# Patient Record
Sex: FEMALE | Race: OTHER | HISPANIC OR LATINO
[De-identification: names, ages, dates, MRNs, and addresses within clinical notes are randomized per-mention and may not be internally consistent; named-entity substitution may affect disease eponyms.]

---

## 2018-12-07 ENCOUNTER — APPOINTMENT (OUTPATIENT)
Dept: MRI IMAGING | Facility: CLINIC | Age: 68
End: 2018-12-07
Payer: MEDICARE

## 2018-12-07 ENCOUNTER — OUTPATIENT (OUTPATIENT)
Dept: OUTPATIENT SERVICES | Facility: HOSPITAL | Age: 68
LOS: 1 days | End: 2018-12-07
Payer: MEDICARE

## 2018-12-07 DIAGNOSIS — Z00.8 ENCOUNTER FOR OTHER GENERAL EXAMINATION: ICD-10-CM

## 2018-12-07 PROCEDURE — 73721 MRI JNT OF LWR EXTRE W/O DYE: CPT | Mod: 26,LT

## 2018-12-07 PROCEDURE — 72148 MRI LUMBAR SPINE W/O DYE: CPT

## 2018-12-07 PROCEDURE — 72148 MRI LUMBAR SPINE W/O DYE: CPT | Mod: 26

## 2018-12-07 PROCEDURE — 73721 MRI JNT OF LWR EXTRE W/O DYE: CPT

## 2020-01-12 ENCOUNTER — EMERGENCY (EMERGENCY)
Facility: HOSPITAL | Age: 70
LOS: 1 days | Discharge: HOME | End: 2020-01-12
Attending: EMERGENCY MEDICINE | Admitting: EMERGENCY MEDICINE
Payer: COMMERCIAL

## 2020-01-12 VITALS
HEART RATE: 67 BPM | DIASTOLIC BLOOD PRESSURE: 97 MMHG | SYSTOLIC BLOOD PRESSURE: 173 MMHG | OXYGEN SATURATION: 97 % | TEMPERATURE: 99 F | RESPIRATION RATE: 18 BRPM

## 2020-01-12 LAB
ALBUMIN SERPL ELPH-MCNC: 4.3 G/DL — SIGNIFICANT CHANGE UP (ref 3.3–5)
ALP SERPL-CCNC: 79 U/L — SIGNIFICANT CHANGE UP (ref 40–120)
ALT FLD-CCNC: 25 U/L — SIGNIFICANT CHANGE UP (ref 4–33)
ANION GAP SERPL CALC-SCNC: 14 MMO/L — SIGNIFICANT CHANGE UP (ref 7–14)
APTT BLD: 32.2 SEC — SIGNIFICANT CHANGE UP (ref 27.5–36.3)
AST SERPL-CCNC: 23 U/L — SIGNIFICANT CHANGE UP (ref 4–32)
BASOPHILS # BLD AUTO: 0.03 K/UL — SIGNIFICANT CHANGE UP (ref 0–0.2)
BASOPHILS NFR BLD AUTO: 0.7 % — SIGNIFICANT CHANGE UP (ref 0–2)
BILIRUB SERPL-MCNC: 0.2 MG/DL — SIGNIFICANT CHANGE UP (ref 0.2–1.2)
BLD GP AB SCN SERPL QL: NEGATIVE — SIGNIFICANT CHANGE UP
BUN SERPL-MCNC: 8 MG/DL — SIGNIFICANT CHANGE UP (ref 7–23)
CALCIUM SERPL-MCNC: 9.4 MG/DL — SIGNIFICANT CHANGE UP (ref 8.4–10.5)
CHLORIDE SERPL-SCNC: 98 MMOL/L — SIGNIFICANT CHANGE UP (ref 98–107)
CO2 SERPL-SCNC: 23 MMOL/L — SIGNIFICANT CHANGE UP (ref 22–31)
CREAT SERPL-MCNC: 0.46 MG/DL — LOW (ref 0.5–1.3)
EOSINOPHIL # BLD AUTO: 0.08 K/UL — SIGNIFICANT CHANGE UP (ref 0–0.5)
EOSINOPHIL NFR BLD AUTO: 1.8 % — SIGNIFICANT CHANGE UP (ref 0–6)
GLUCOSE SERPL-MCNC: 110 MG/DL — HIGH (ref 70–99)
HCT VFR BLD CALC: 40.9 % — SIGNIFICANT CHANGE UP (ref 34.5–45)
HGB BLD-MCNC: 13.4 G/DL — SIGNIFICANT CHANGE UP (ref 11.5–15.5)
IMM GRANULOCYTES NFR BLD AUTO: 0.2 % — SIGNIFICANT CHANGE UP (ref 0–1.5)
INR BLD: 1.02 — SIGNIFICANT CHANGE UP (ref 0.88–1.17)
LIDOCAIN IGE QN: 37 U/L — SIGNIFICANT CHANGE UP (ref 7–60)
LYMPHOCYTES # BLD AUTO: 2.15 K/UL — SIGNIFICANT CHANGE UP (ref 1–3.3)
LYMPHOCYTES # BLD AUTO: 48.3 % — HIGH (ref 13–44)
MAGNESIUM SERPL-MCNC: 2 MG/DL — SIGNIFICANT CHANGE UP (ref 1.6–2.6)
MCHC RBC-ENTMCNC: 29.5 PG — SIGNIFICANT CHANGE UP (ref 27–34)
MCHC RBC-ENTMCNC: 32.8 % — SIGNIFICANT CHANGE UP (ref 32–36)
MCV RBC AUTO: 89.9 FL — SIGNIFICANT CHANGE UP (ref 80–100)
MONOCYTES # BLD AUTO: 0.35 K/UL — SIGNIFICANT CHANGE UP (ref 0–0.9)
MONOCYTES NFR BLD AUTO: 7.9 % — SIGNIFICANT CHANGE UP (ref 2–14)
NEUTROPHILS # BLD AUTO: 1.83 K/UL — SIGNIFICANT CHANGE UP (ref 1.8–7.4)
NEUTROPHILS NFR BLD AUTO: 41.1 % — LOW (ref 43–77)
NRBC # FLD: 0 K/UL — SIGNIFICANT CHANGE UP (ref 0–0)
PHOSPHATE SERPL-MCNC: 3 MG/DL — SIGNIFICANT CHANGE UP (ref 2.5–4.5)
PLATELET # BLD AUTO: 271 K/UL — SIGNIFICANT CHANGE UP (ref 150–400)
PMV BLD: 8.5 FL — SIGNIFICANT CHANGE UP (ref 7–13)
POTASSIUM SERPL-MCNC: 4.3 MMOL/L — SIGNIFICANT CHANGE UP (ref 3.5–5.3)
POTASSIUM SERPL-SCNC: 4.3 MMOL/L — SIGNIFICANT CHANGE UP (ref 3.5–5.3)
PROT SERPL-MCNC: 8 G/DL — SIGNIFICANT CHANGE UP (ref 6–8.3)
PROTHROM AB SERPL-ACNC: 11.6 SEC — SIGNIFICANT CHANGE UP (ref 9.8–13.1)
RBC # BLD: 4.55 M/UL — SIGNIFICANT CHANGE UP (ref 3.8–5.2)
RBC # FLD: 13.1 % — SIGNIFICANT CHANGE UP (ref 10.3–14.5)
RH IG SCN BLD-IMP: POSITIVE — SIGNIFICANT CHANGE UP
SODIUM SERPL-SCNC: 135 MMOL/L — SIGNIFICANT CHANGE UP (ref 135–145)
WBC # BLD: 4.45 K/UL — SIGNIFICANT CHANGE UP (ref 3.8–10.5)
WBC # FLD AUTO: 4.45 K/UL — SIGNIFICANT CHANGE UP (ref 3.8–10.5)

## 2020-01-12 PROCEDURE — 99284 EMERGENCY DEPT VISIT MOD MDM: CPT

## 2020-01-12 RX ORDER — SODIUM CHLORIDE 9 MG/ML
1000 INJECTION, SOLUTION INTRAVENOUS ONCE
Refills: 0 | Status: COMPLETED | OUTPATIENT
Start: 2020-01-12 | End: 2020-01-12

## 2020-01-12 RX ORDER — ACETAMINOPHEN 500 MG
650 TABLET ORAL ONCE
Refills: 0 | Status: COMPLETED | OUTPATIENT
Start: 2020-01-12 | End: 2020-01-12

## 2020-01-12 RX ADMIN — SODIUM CHLORIDE 2000 MILLILITER(S): 9 INJECTION, SOLUTION INTRAVENOUS at 21:54

## 2020-01-12 RX ADMIN — SODIUM CHLORIDE 1000 MILLILITER(S): 9 INJECTION, SOLUTION INTRAVENOUS at 23:50

## 2020-01-12 RX ADMIN — Medication 650 MILLIGRAM(S): at 21:54

## 2020-01-12 RX ADMIN — Medication 650 MILLIGRAM(S): at 23:00

## 2020-01-12 NOTE — ED PROVIDER NOTE - OBJECTIVE STATEMENT
69 Y F with hx of IPF not on any meds as well as HLD here with RUQ pain that started 2 days ago. Pt notes that she was just getting over flu like ill ness that started last week and developed RUQ pain radiating to her back. Pain is worse with laying down especially at night. She notes that she has associated nausea. She denies fevers, vomiting. She states that food doesn't make the pain worse. Denies dysuria, fever, hematuria.

## 2020-01-12 NOTE — ED ADULT NURSE NOTE - OBJECTIVE STATEMENT
Patient received to room 2, A&Ox4, ambulatory, c/o R sided flank pain for three days. Pt. reports she was recently treated for the flu and her symptoms started after. Abdomen soft, nondistended, mildly tender on palpation to R side. Pt. states pain radiates to R side of back. Denies hematuria/dysuria. Denies CP/SOB/HA, no nausea/vomiting/diarrhea. 18 G IV placed to R AC, labs drawn and sent. MD at bedside. VS as noted. Awaiting further orders, will continue to monitor.

## 2020-01-12 NOTE — ED PROVIDER NOTE - PATIENT PORTAL LINK FT
You can access the FollowMyHealth Patient Portal offered by [facility] by registering at the following website: [email or web address]. By joining [employer]’s FollowMyHealth portal, you will also be able to view your health information using other applications (apps) compatible with our system.

## 2020-01-12 NOTE — ED PROVIDER NOTE - ATTENDING CONTRIBUTION TO CARE
69F p/w  lower abd pain rad to back.  Pt had a flu like illness x 1 week, pt thinks she's getting better.   pt had cough and fever.  Also had vomiting 1 week ago.  The pain started 2 days ago, sharp pain.  Had the pain a long time ago, did not seek medical care.  Pain is worse with laying down, no change with eating, worse at night, difficulty sleeping; pt also with restless leg syndrome.   Also c/o HA.  PMXH - pulm fibrosis/emphysema, HLD, HTN (not on meds).  meds- zocor  No T.  PSHX - ov cyst removal.  All - NKA.  No dysuria or hematuria.  did not take medicine today.  Concern for diverticulitis, colitis, appendicitis, pyelonephritis, cholecystitis, check labs, CT, urine, bedside US, reass.  Rx tylenol and fluids for now. 69F p/w  lower abd pain rad to back.  Pt had a flu like illness x 1 week, pt thinks she's getting better.   pt had cough and fever.  Also had vomiting 1 week ago.  The pain started 2 days ago, sharp pain.  Had the pain a long time ago, did not seek medical care.  Pain is worse with laying down, no change with eating, worse at night, difficulty sleeping; pt also with restless leg syndrome.   Also c/o HA.  PMXH - pulm fibrosis/emphysema, HLD, HTN (not on meds).  meds- zocor  No T.  PSHX - ov cyst removal.  All - NKA.  No dysuria or hematuria.  did not take medicine today.  Concern for diverticulitis, colitis, appendicitis, pyelonephritis, cholecystitis, check labs, CT, urine, bedside US, reass.  Rx tylenol and fluids for now.  Check labs for anemia, electrolyte disturbance, acute kidney injury, liver dysfucntion, reassess.  VS:  unremarkable except htn    GEN - mild distress abd pain, malaise A+O x3   HEAD - NC/AT     ENT - PEERL, EOMI, mucous membranes dry , no discharge      NECK: Neck supple, non-tender without lymphadenopathy, no masses, no JVD  PULM - CTA b/l,  symmetric breath sounds  COR -  normal heart sounds    ABD - , ND, RUQ, RLQ ttp, soft,  BACK - no CVA tenderness, nontender spine     EXTREMS - no edema, no deformity, warm and well perfused    SKIN - no rash or bruising      NEUROLOGIC - alert, CN 2-12 intact, sensation nl, motor no focal deficit.

## 2020-01-12 NOTE — ED PROVIDER NOTE - NSFOLLOWUPINSTRUCTIONS_ED_ALL_ED_FT
You have signs of gallstones without any signs of gallbladder infection. You need to follow up with general surgery if you continue to have the pain you will likely need to get your gallbladder removed. We gave you the general surgery clinic number so you can have this done electively if you wish. If you have any severe increase in pain, fever, uncontrollable nausea vomiting, or inability to tolerate eating and drinking you need to come right back to the emergency room.

## 2020-01-12 NOTE — ED PROVIDER NOTE - CLINICAL SUMMARY MEDICAL DECISION MAKING FREE TEXT BOX
69 Y F with hx of HLD here with RUQ pain x 2 days with associated nausea, on exam TTP RUQ and RLQ. Will get labs including pre-ops, will get lipase. Will order CT AP with contrast possible kidney stone vs appy, will do bedside US if negative then will proceed with CT, pain control.

## 2020-01-12 NOTE — ED PROVIDER NOTE - PROGRESS NOTE DETAILS
Resident: Rigo Stephenson – Pt was re-evaluated at bedside, VSS, feeling better overall. Results were discussed with patient as well as return precautions and follow up plan with PCP and/or specialist. Time was taken to answer any questions that the patient had before providing them with discharge paperwork.

## 2020-01-13 VITALS
OXYGEN SATURATION: 99 % | TEMPERATURE: 98 F | SYSTOLIC BLOOD PRESSURE: 151 MMHG | HEART RATE: 58 BPM | DIASTOLIC BLOOD PRESSURE: 85 MMHG | RESPIRATION RATE: 16 BRPM

## 2020-01-13 DIAGNOSIS — Z98.890 OTHER SPECIFIED POSTPROCEDURAL STATES: Chronic | ICD-10-CM

## 2020-01-13 PROCEDURE — 74177 CT ABD & PELVIS W/CONTRAST: CPT | Mod: 26

## 2020-01-13 PROCEDURE — 76705 ECHO EXAM OF ABDOMEN: CPT | Mod: 26

## 2020-01-15 RX ORDER — METRONIDAZOLE 500 MG
1 TABLET ORAL
Qty: 21 | Refills: 0
Start: 2020-01-15 | End: 2020-01-21

## 2020-01-15 RX ORDER — CIPROFLOXACIN LACTATE 400MG/40ML
1 VIAL (ML) INTRAVENOUS
Qty: 14 | Refills: 0
Start: 2020-01-15 | End: 2020-01-21

## 2020-01-25 ENCOUNTER — EMERGENCY (EMERGENCY)
Facility: HOSPITAL | Age: 70
LOS: 1 days | Discharge: HOME | End: 2020-01-25
Attending: EMERGENCY MEDICINE | Admitting: EMERGENCY MEDICINE
Payer: COMMERCIAL

## 2020-01-25 VITALS
HEART RATE: 61 BPM | RESPIRATION RATE: 17 BRPM | DIASTOLIC BLOOD PRESSURE: 73 MMHG | OXYGEN SATURATION: 100 % | SYSTOLIC BLOOD PRESSURE: 138 MMHG | TEMPERATURE: 97 F

## 2020-01-25 VITALS
TEMPERATURE: 99 F | SYSTOLIC BLOOD PRESSURE: 137 MMHG | HEART RATE: 72 BPM | DIASTOLIC BLOOD PRESSURE: 76 MMHG | OXYGEN SATURATION: 98 % | RESPIRATION RATE: 16 BRPM

## 2020-01-25 DIAGNOSIS — Z98.890 OTHER SPECIFIED POSTPROCEDURAL STATES: Chronic | ICD-10-CM

## 2020-01-25 PROBLEM — E78.5 HYPERLIPIDEMIA, UNSPECIFIED: Chronic | Status: ACTIVE | Noted: 2020-01-13

## 2020-01-25 PROBLEM — J84.10 PULMONARY FIBROSIS, UNSPECIFIED: Chronic | Status: ACTIVE | Noted: 2020-01-13

## 2020-01-25 PROBLEM — I10 ESSENTIAL (PRIMARY) HYPERTENSION: Chronic | Status: ACTIVE | Noted: 2020-01-13

## 2020-01-25 LAB
ALBUMIN SERPL ELPH-MCNC: 4 G/DL — SIGNIFICANT CHANGE UP (ref 3.3–5)
ALP SERPL-CCNC: 76 U/L — SIGNIFICANT CHANGE UP (ref 40–120)
ALT FLD-CCNC: 28 U/L — SIGNIFICANT CHANGE UP (ref 4–33)
ANION GAP SERPL CALC-SCNC: 11 MMO/L — SIGNIFICANT CHANGE UP (ref 7–14)
APPEARANCE UR: CLEAR — SIGNIFICANT CHANGE UP
AST SERPL-CCNC: 35 U/L — HIGH (ref 4–32)
BASOPHILS # BLD AUTO: 0.04 K/UL — SIGNIFICANT CHANGE UP (ref 0–0.2)
BASOPHILS NFR BLD AUTO: 0.6 % — SIGNIFICANT CHANGE UP (ref 0–2)
BILIRUB SERPL-MCNC: 0.3 MG/DL — SIGNIFICANT CHANGE UP (ref 0.2–1.2)
BILIRUB UR-MCNC: NEGATIVE — SIGNIFICANT CHANGE UP
BLOOD UR QL VISUAL: NEGATIVE — SIGNIFICANT CHANGE UP
BUN SERPL-MCNC: 14 MG/DL — SIGNIFICANT CHANGE UP (ref 7–23)
CALCIUM SERPL-MCNC: 8.8 MG/DL — SIGNIFICANT CHANGE UP (ref 8.4–10.5)
CHLORIDE SERPL-SCNC: 104 MMOL/L — SIGNIFICANT CHANGE UP (ref 98–107)
CO2 SERPL-SCNC: 22 MMOL/L — SIGNIFICANT CHANGE UP (ref 22–31)
COLOR SPEC: SIGNIFICANT CHANGE UP
CREAT SERPL-MCNC: 0.48 MG/DL — LOW (ref 0.5–1.3)
EOSINOPHIL # BLD AUTO: 0.14 K/UL — SIGNIFICANT CHANGE UP (ref 0–0.5)
EOSINOPHIL NFR BLD AUTO: 2 % — SIGNIFICANT CHANGE UP (ref 0–6)
GLUCOSE SERPL-MCNC: 91 MG/DL — SIGNIFICANT CHANGE UP (ref 70–99)
GLUCOSE UR-MCNC: NEGATIVE — SIGNIFICANT CHANGE UP
HCT VFR BLD CALC: 38.7 % — SIGNIFICANT CHANGE UP (ref 34.5–45)
HGB BLD-MCNC: 12.7 G/DL — SIGNIFICANT CHANGE UP (ref 11.5–15.5)
IMM GRANULOCYTES NFR BLD AUTO: 0.3 % — SIGNIFICANT CHANGE UP (ref 0–1.5)
KETONES UR-MCNC: NEGATIVE — SIGNIFICANT CHANGE UP
LEUKOCYTE ESTERASE UR-ACNC: NEGATIVE — SIGNIFICANT CHANGE UP
LYMPHOCYTES # BLD AUTO: 2.53 K/UL — SIGNIFICANT CHANGE UP (ref 1–3.3)
LYMPHOCYTES # BLD AUTO: 35.7 % — SIGNIFICANT CHANGE UP (ref 13–44)
MAGNESIUM SERPL-MCNC: 2.1 MG/DL — SIGNIFICANT CHANGE UP (ref 1.6–2.6)
MCHC RBC-ENTMCNC: 30.2 PG — SIGNIFICANT CHANGE UP (ref 27–34)
MCHC RBC-ENTMCNC: 32.8 % — SIGNIFICANT CHANGE UP (ref 32–36)
MCV RBC AUTO: 91.9 FL — SIGNIFICANT CHANGE UP (ref 80–100)
MONOCYTES # BLD AUTO: 0.58 K/UL — SIGNIFICANT CHANGE UP (ref 0–0.9)
MONOCYTES NFR BLD AUTO: 8.2 % — SIGNIFICANT CHANGE UP (ref 2–14)
NEUTROPHILS # BLD AUTO: 3.77 K/UL — SIGNIFICANT CHANGE UP (ref 1.8–7.4)
NEUTROPHILS NFR BLD AUTO: 53.2 % — SIGNIFICANT CHANGE UP (ref 43–77)
NITRITE UR-MCNC: NEGATIVE — SIGNIFICANT CHANGE UP
NRBC # FLD: 0 K/UL — SIGNIFICANT CHANGE UP (ref 0–0)
PH UR: 5.5 — SIGNIFICANT CHANGE UP (ref 5–8)
PHOSPHATE SERPL-MCNC: 3.4 MG/DL — SIGNIFICANT CHANGE UP (ref 2.5–4.5)
PLATELET # BLD AUTO: 305 K/UL — SIGNIFICANT CHANGE UP (ref 150–400)
PMV BLD: 8.7 FL — SIGNIFICANT CHANGE UP (ref 7–13)
POTASSIUM SERPL-MCNC: 5.3 MMOL/L — SIGNIFICANT CHANGE UP (ref 3.5–5.3)
POTASSIUM SERPL-SCNC: 5.3 MMOL/L — SIGNIFICANT CHANGE UP (ref 3.5–5.3)
PROT SERPL-MCNC: 7.7 G/DL — SIGNIFICANT CHANGE UP (ref 6–8.3)
PROT UR-MCNC: NEGATIVE — SIGNIFICANT CHANGE UP
RBC # BLD: 4.21 M/UL — SIGNIFICANT CHANGE UP (ref 3.8–5.2)
RBC # FLD: 14.3 % — SIGNIFICANT CHANGE UP (ref 10.3–14.5)
SODIUM SERPL-SCNC: 137 MMOL/L — SIGNIFICANT CHANGE UP (ref 135–145)
SP GR SPEC: 1.01 — SIGNIFICANT CHANGE UP (ref 1–1.04)
UROBILINOGEN FLD QL: NORMAL — SIGNIFICANT CHANGE UP
WBC # BLD: 7.08 K/UL — SIGNIFICANT CHANGE UP (ref 3.8–10.5)
WBC # FLD AUTO: 7.08 K/UL — SIGNIFICANT CHANGE UP (ref 3.8–10.5)

## 2020-01-25 PROCEDURE — 99284 EMERGENCY DEPT VISIT MOD MDM: CPT

## 2020-01-25 RX ORDER — METOCLOPRAMIDE HCL 10 MG
10 TABLET ORAL ONCE
Refills: 0 | Status: COMPLETED | OUTPATIENT
Start: 2020-01-25 | End: 2020-01-25

## 2020-01-25 RX ORDER — ACETAMINOPHEN 500 MG
975 TABLET ORAL ONCE
Refills: 0 | Status: COMPLETED | OUTPATIENT
Start: 2020-01-25 | End: 2020-01-25

## 2020-01-25 RX ADMIN — Medication 975 MILLIGRAM(S): at 17:34

## 2020-01-25 RX ADMIN — Medication 975 MILLIGRAM(S): at 17:21

## 2020-01-25 RX ADMIN — Medication 10 MILLIGRAM(S): at 17:21

## 2020-01-25 NOTE — ED PROVIDER NOTE - NSFOLLOWUPINSTRUCTIONS_ED_ALL_ED_FT
Please follow up with your primary doctor in 1-3 days to ensure resolution of symptoms.    Please contact the GI physician, listed below, to schedule an appointment to discuss your GERD. You can use 500-1000mg Tylenol every 6 hours for pain - as needed; maximal daily dose 3000mg.  This is an over-the-counter medications - please respect the warnings on the label. This medication come with certain risks and side effects that you need to discuss with your doctor, especially if you are taking it for a prolonged period.     Please follow up with your primary doctor in 1-3 days to ensure resolution of symptoms.    Please contact the GI physician, listed below, to schedule an appointment to discuss your GERD.

## 2020-01-25 NOTE — ED ADULT TRIAGE NOTE - CHIEF COMPLAINT QUOTE
states" I am having ahead ache for 3 days and I have black/brown stools and I feel weak. was admitted and d/c from here bon 17th and took antibiotics. unable to give me further details. h/o Pulmonary fibrosis

## 2020-01-25 NOTE — ED PROVIDER NOTE - PHYSICAL EXAMINATION
Vital signs reviewed.  CONSTITUTIONAL: NAD, awake  HEAD: Normocephalic; atraumatic  EYES: EOMI, no nystagmus, no conjunctival injection, no scleral icterus  MOUTH/THROAT:  MMM  NECK: Trachea midline, no JVD  CV: Normal S1, S2; no audible murmurs; extremities WWP  RESP: normal work of breathing; CTAB, no stridor  ABD: soft, non-distended; non-tender  : Deferred  MSK/EXT: no edema, normal ROM in all four extremities, no deformities  SKIN: No gross rashes or lesions on exposed skin, no significant trophic changes  NEURO: aaox3, moving all extremities purposefully and spontaneously

## 2020-01-25 NOTE — ED ADULT NURSE NOTE - NSIMPLEMENTINTERV_GEN_ALL_ED
Implemented All Fall with Harm Risk Interventions:  [place] to call system. Call bell, personal items and telephone within reach. Instruct patient to call for assistance. Room bathroom lighting operational. Non-slip footwear when patient is off stretcher. Physically safe environment: no spills, clutter or unnecessary equipment. Stretcher in lowest position, wheels locked, appropriate side rails in place. Provide visual cue, wrist band, yellow gown, etc. Monitor gait and stability. Monitor for mental status changes and reorient to person, place, and time. Review medications for side effects contributing to fall risk. Reinforce activity limits and safety measures with patient and family. Provide visual clues: red socks.

## 2020-01-25 NOTE — ED ADULT NURSE NOTE - OBJECTIVE STATEMENT
Pt presents to the ED AxOx4, ambulatory, c/o headache x3 days, abdominal pain and weakness. Pt states that her stool has been brown and black. Pt states that she was admitted here on the 17th and was dc with antibiotics due to colitis. Denies chest pain, shortness of breath, difficulty breathing. Respirations even and unlabored. Abdomen distended and tender to touch. Skin warm, dry and intact. No swelling noted in extremities. 20 gauge IV placed in R hand. Comfort measures provided. Awaiting further orders. Will continue to monitor.

## 2020-01-25 NOTE — ED PROVIDER NOTE - CLINICAL SUMMARY MEDICAL DECISION MAKING FREE TEXT BOX
70F PMH pulmonary fibrosis, diverticulitis presents with headache and fatigue x2 days.  No dark stools no BRBPR.  Will get labs to ensure no change in hgb from recent ED presentation, give reglan for headache, give GI outpatient contact info.

## 2020-01-25 NOTE — ED PROVIDER NOTE - NSFOLLOWUPCLINICS_GEN_ALL_ED_FT
[facility]  Gastroenterology  [address] [place] [id number]  Phone: [phone]  Fax:   Follow Up Time: Routine

## 2020-01-25 NOTE — ED PROVIDER NOTE - PROGRESS NOTE DETAILS
Patient reassessed, feeling improved.  INformed of benign labs, will f/u with PCP and GI.  Patient agreeable.

## 2020-01-25 NOTE — ED PROVIDER NOTE - ATTENDING CONTRIBUTION TO CARE
Attending Statement: I have personally seen and examined this patient. I have fully participated in the care of this patient. I have reviewed all pertinent clinical information, including history physical exam, plan and the Resident's note and agree except as noted  69yo F hx of pulmonary fibrosis, diverticulosis recent dc w 10 course of cipro/flagly, pw feeling lightheaded, dizzy and having a headache x 2 days. States stool is now brown, no hx of BRBPR, no abdominal pain. no vomit; no diarrhea. no cp or sob. Felt spinning sensation yesterday none today. +burning, sour taste in the tongue  and a dull headache. no focal deficits. no numbness/weakness. no change in vision. no slurred speech. not on AC.    Vital signs noted. anxious female. nontoxic appearing. mmm. not pale. normal S1-S2 No resp distress. able to speak in full and clear sentences. no wheeze, rales or stridor. soft nontender abdomen. no  rebound. no guarding. no sign of trauma. no CVAT AOx3, no focal deficits. CN 2-12 grossly intact. nl gait. no meningeal signs. no nystagmus. no pedal edema. no calf tenderness. normal pulses bilateral feet.   plan labs, med and re assess Attending Statement: I have personally seen and examined this patient. I have fully participated in the care of this patient. I have reviewed all pertinent clinical information, including history physical exam, plan and the Resident's note and agree except as noted  69yo F hx of pulmonary fibrosis, diverticulosis recent dc w 10 course of cipro/flagly, pw feeling lightheaded, dizzy and having a headache x 2 days. States stool is now brown, no hx of BRBPR, no abdominal pain. no vomit; no diarrhea. no cp or sob. Felt spinning sensation yesterday none today. +burning, sour taste in the tongue  and a dull headache. no focal deficits. no numbness/weakness. no change in vision. no slurred speech. not on AC.     Vital signs noted. anxious female. nontoxic appearing. mmm. not pale. normal S1-S2 No resp distress. able to speak in full and clear sentences. no wheeze, rales or stridor. soft nontender abdomen. no  rebound. no guarding. no sign of trauma. no CVAT AOx3, no focal deficits. CN 2-12 grossly intact. nl gait. no meningeal signs. no nystagmus. no pedal edema. no calf tenderness. normal pulses bilateral feet.   plan labs, med and re assess

## 2020-01-25 NOTE — ED PROVIDER NOTE - NS ED ROS FT
In additional the that documented in the HPI, the additional ROS was obtained:    CONSTITUTIONAL: No fever, no chills  EYES: no change in vision, no blurred vision  HEENT: no trouble swallowing, no trouble speaking, no sore throat  NECK: no pain, no stiffness  CV: no chest pain, no palpitations  RESP: no cough, no shortness of breath  GI: no abdominal pain, no nausea, no vomiting, no diarrhea, no constipation, no BRBPR or melena  : No dysuria, no frequency  NEURO: +headache, no new numbness, no weakness, no dizziness  SKIN: no new rashes  HEME: No easy bruising or bleeding  MSK: No joint pain, no recent trauma  Juan Miguel Osorio M.D. -Resident

## 2020-01-25 NOTE — ED PROVIDER NOTE - OBJECTIVE STATEMENT
70F pmh pulmonary fibrosis, diverticulosis with recent diverticulitis s/p cipro/flagyl 10 day course presents with headache and fatigue, dizziness past 2 days.  She was seen in our ED, found to have gallstones, uncomplicated diverticulitis given 10 days cipro/flagyl which she finished.  No more black stools, abd pain improved.    She now has mild headache and is concerned for anemia.  No fevers, URI sx, cough, SOB, CP, nausea, vomiting, diarrhea or constipation.  She does endorse loose stools.  She also has GERD symptoms and would like to see a gastroenterologist.

## 2020-01-25 NOTE — ED PROVIDER NOTE - PATIENT PORTAL LINK FT
You can access the FollowMyHealth Patient Portal offered by [facility] by registering at the following website: [email or web address]. By joining [employer]’s FollowMyHealth portal, you will also be able to view your health information using other applications (apps) compatible with our system.

## 2020-01-28 ENCOUNTER — APPOINTMENT (OUTPATIENT)
Dept: GASTROENTEROLOGY | Facility: CLINIC | Age: 70
End: 2020-01-28
Payer: MEDICARE

## 2020-01-28 VITALS — OXYGEN SATURATION: 95 % | BODY MASS INDEX: 31.36 KG/M2 | HEIGHT: 63 IN | HEART RATE: 78 BPM | WEIGHT: 177 LBS

## 2020-01-28 DIAGNOSIS — Z86.69 PERSONAL HISTORY OF OTHER DISEASES OF THE NERVOUS SYSTEM AND SENSE ORGANS: ICD-10-CM

## 2020-01-28 DIAGNOSIS — K57.10 DIVERTICULOSIS OF SMALL INTESTINE W/OUT PERFORATION OR ABSCESS W/OUT BLEEDING: ICD-10-CM

## 2020-01-28 DIAGNOSIS — Z87.42 PERSONAL HISTORY OF OTHER DISEASES OF THE FEMALE GENITAL TRACT: ICD-10-CM

## 2020-01-28 DIAGNOSIS — K57.30 DIVERTICULOSIS OF LARGE INTESTINE W/OUT PERFORATION OR ABSCESS W/OUT BLEEDING: ICD-10-CM

## 2020-01-28 DIAGNOSIS — J43.9 EMPHYSEMA, UNSPECIFIED: ICD-10-CM

## 2020-01-28 DIAGNOSIS — R14.0 ABDOMINAL DISTENSION (GASEOUS): ICD-10-CM

## 2020-01-28 PROCEDURE — 99205 OFFICE O/P NEW HI 60 MIN: CPT

## 2020-01-28 NOTE — ASSESSMENT
[FreeTextEntry1] : Impression:\par \par #1 abdominal pain-episode of abdominal pain starting 1/10/2020 prompting ER evaluation. Some disparity in description of symptoms- reports patient indicated pain was across the lower abdomen and right side of the abdomen. Patient indicates pain was right upper quadrant. Gallstones noted and question exists as to whether episode of pain was biliary colic. However, difficult to say as symptoms appeared in context of resolving somewhat severe viral flulike illness with other somatic and constitutional symptoms. In addition, equivocal bowel wall thickening of ascending and transverse colon noted although might have been under distention. Clear inflammatory process at that site not reported. Diverticulitis not reported. Episode of abdominal pain currently resolved and essentially symptomatic from GI standpoint at this time except for chronic gaseous symptoms.\par \par #2 cholelithiasis-detected on ultrasound. As noted, not clear if abdominal pain prompting recent ER visit was biliary colic versus an incidental finding.\par \par #3 diverticulosis-marked diverticulosis noted mostly in the sigmoid and descending colon on CT. No indication or report of acute diverticulitis.\par \par #4 ascending and transverse colon wall thickening-equivocal finding possibly under distention. Inflammatory changes at that site not reported. Correlate with recent colonoscopy of 12/2019.\par \par #5 duodenal diverticulum.\par \par #6 colon cancer screening-reports normal colonoscopy in 12/2019. Patient average risk.\par \par #7 abdominal gaseous discomfort-chronic symptoms of gas and bloating.\par \par #8 pulmonary fibrosis.\par \par #9 history of emphysema-history of mild obstructive sleep apnea.\par \par #10 obesity.\par \par #11 viral syndrome-flulike viral symptoms 1/2020 consistent with a viral illness. Resolved at the current time.\par \par #12 history of cervical dysplasia.\par \par #13 dysgeusia-describes acid-like feeling amount. Only since antibiotics which is likely etiology from metronidazole.

## 2020-01-28 NOTE — HISTORY OF PRESENT ILLNESS
[FreeTextEntry1] : Office consultation on 1/28/2020.\par \par The patient is a 70-year-old woman evaluated for consultation regarding recent episode of abdominal pain and abnormal abdominal imaging following an emergency room evaluation.\par \par Patient was in usual state of health until approximately 1/5/2028 when she experienced "cold and flulike like" symptoms with complaints of cough, headache, diarrhea, nausea, vomiting weakness, joint pain and fever. Initially was constipated for 2 days and then had several days of nonbloody diarrhea. Experienced nausea with few episodes of nonbloody nonbilious emesis.  observed patient to be mildly confused. No abdominal pain at that time.\par \par These viral and flulike symptoms gradually improved over the course of a week. Then on 1/10/2020 patient experienced onset of abdominal pain. Of note,  indicates that the pain was across the lower abdomen and right side of the abdomen. Patient says the pain was right upper quadrant. This pain was described as a constant and sharp pain with some radiation to back. By that time fever had resolved and there was no further nausea and vomiting.\par \par Symptoms prompted ER evaluation on 1/12/2020. Evaluation at that time:\par -CMP: Normal except for glucose 110. Creatinine 0.46. Normal liver enzymes including ALT 25.\par -Lipase 37.\par -CBC: WBC 4450, hemoglobin 13.4, hematocrit 40.9, platelet count 271,000.\par -INR 1.02.\par -Abdominal ultrasound noted for cholelithiasis but no gallbladder wall thickening or pericholecystic fluid. Liver was normal. CBD measured 4 mm. Visualized pancreas was normal. Kidney stones not detected.\par -CT scan abdomen noted for bibasilar atelectasis. Liver, gallbladder, bile duct and pancreas was normal. Of note, gallstones not reported. Mild wall thickening versus under distention of the ascending colon and transverse colon was noted. Duodenal diverticulum detected. Extensive colonic diverticulosis reported predominantly involving sigmoid and descending colon. Obstruction not detected. No indication of acute diverticulitis.\par \par Therapy with Cipro and metronidazole b.i.d. was prescribed. Completed one week of therapy. Of note, records indicate treatment for acute diverticulitis although, as noted, CT report does not describe radiographic features of diverticulitis.\par \par Patient returned to the ER on 1/25/2020 with complaint of dizziness. ER evaluation:\par -CMP: Normal except for AST 35. Other liver enzymes normal including ALT 28.\par -CBC: WBC 7080, hemoglobin 12.7, hematocrit 38.7, platelet count 305,000.\par -Urinalysis normal.\par \par Patient significantly improved at current time. Prior viral and flulike symptoms resolved. Prior GI symptoms including nausea, vomiting, abdominal pain and diarrhea resolved.\par \par Appetite is stable. Reports 4 pound weight increase. Denies any complaints of dysphagia. Swallows liquids without any choking, coughing or nasal regurgitation. Swallows solid food without any retrosternal bolus hangup. Denies abdominal pain. Has 1-2 formed bowel movements daily. No current complaints of diarrhea or constipation.\par \par Since antibiotics patient complains of feeling of acidity in her mouth with lemon like sensation. Some sour belching and feeling of regurgitation. These symptoms are all since antibiotics.\par \par Patient has chronic symptoms of bloating and gas.\par \par Of note, patient reports having had a normal colonoscopy several weeks before onset of current symptoms as routine screening.\par \par Patient denies past history of digestive illness except as noted. Family history of colon cancer is not reported.

## 2020-01-28 NOTE — PHYSICAL EXAM
[General Appearance - Alert] : alert [General Appearance - In No Acute Distress] : in no acute distress [General Appearance - Well Developed] : well developed [General Appearance - Well-Appearing] : healthy appearing [Sclera] : the sclera and conjunctiva were normal [Oropharynx] : the oropharynx was normal [Neck Appearance] : the appearance of the neck was normal [Neck Cervical Mass (___cm)] : no neck mass was observed [Respiration, Rhythm And Depth] : normal respiratory rhythm and effort [Exaggerated Use Of Accessory Muscles For Inspiration] : no accessory muscle use [Heart Rate And Rhythm] : heart rate was normal and rhythm regular [Heart Sounds] : normal S1 and S2 [Heart Sounds Gallop] : no gallops [Murmurs] : no murmurs [Heart Sounds Pericardial Friction Rub] : no pericardial rub [Edema] : there was no peripheral edema [Bowel Sounds] : normal bowel sounds [Abdomen Soft] : soft [Abdomen Tenderness] : non-tender [] : no hepato-splenomegaly [Abdomen Mass (___ Cm)] : no abdominal mass palpated [Abdomen Hernia] : no hernia was discovered [Cervical Lymph Nodes Enlarged Posterior Bilaterally] : posterior cervical [Cervical Lymph Nodes Enlarged Anterior Bilaterally] : anterior cervical [Supraclavicular Lymph Nodes Enlarged Bilaterally] : supraclavicular [No CVA Tenderness] : no ~M costovertebral angle tenderness [Abnormal Walk] : normal gait [Nail Clubbing] : no clubbing  or cyanosis of the fingernails [Skin Color & Pigmentation] : normal skin color and pigmentation [Oriented To Time, Place, And Person] : oriented to person, place, and time [Impaired Insight] : insight and judgment were intact [Affect] : the affect was normal [Mood] : the mood was normal [FreeTextEntry1] : Abdomen is mildly obese, soft, nontender, nondistended and without mass or hepatosplenomegaly.

## 2020-01-28 NOTE — REVIEW OF SYSTEMS
[As Noted in HPI] : as noted in HPI [Eyesight Problems] : eyesight problems [Joint Pain] : joint pain [Dizziness] : dizziness [Anxiety] : anxiety [Negative] : Heme/Lymph [FreeTextEntry8] : urinary frequency.

## 2020-04-29 ENCOUNTER — APPOINTMENT (OUTPATIENT)
Dept: GASTROENTEROLOGY | Facility: CLINIC | Age: 70
End: 2020-04-29

## 2020-12-04 NOTE — ED PROVIDER NOTE - INTERNATIONAL TRAVEL
Pt's brief changed. Minimal amount of coffee ground stool present. Perineal care performed, external catheter applied. NAD, vitals stable, denies pain, A+Ox4. Pt repositioned in bed. Provided ice chips. Denies further needs at this time. Chastity Raya, NP notified of 7.0 Hgb. Verbal orders for q4h H&H.  No further orders at this time No

## 2022-02-07 ENCOUNTER — APPOINTMENT (OUTPATIENT)
Dept: HEPATOLOGY | Facility: CLINIC | Age: 72
End: 2022-02-07
Payer: MEDICARE

## 2022-02-07 VITALS
SYSTOLIC BLOOD PRESSURE: 131 MMHG | TEMPERATURE: 97.6 F | HEIGHT: 61.25 IN | DIASTOLIC BLOOD PRESSURE: 77 MMHG | HEART RATE: 69 BPM | BODY MASS INDEX: 34.29 KG/M2 | OXYGEN SATURATION: 96 % | WEIGHT: 184 LBS | RESPIRATION RATE: 16 BRPM

## 2022-02-07 DIAGNOSIS — Z83.6 FAMILY HISTORY OF OTHER DISEASES OF THE RESPIRATORY SYSTEM: ICD-10-CM

## 2022-02-07 DIAGNOSIS — R35.1 NOCTURIA: ICD-10-CM

## 2022-02-07 DIAGNOSIS — Z78.9 OTHER SPECIFIED HEALTH STATUS: ICD-10-CM

## 2022-02-07 PROCEDURE — 99204 OFFICE O/P NEW MOD 45 MIN: CPT
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
## 2022-02-07 NOTE — ASSESSMENT
[FreeTextEntry1] : Ms. ARANDA is a 72 year old woman with\par \par - NAFLD. MRI wo in 1/2022 showed moderate steatosis, fat fraction 17%\par - obesity\par - limited exercise tolerance due to hip, knee and ankle pain\par - elevated ferritin 700 ng/mL in 1/2022, normal transferrin saturation. Negative hemochromatosis testing\par \par \par Plan:\par - bloodwork as below, fibroscan, US abdomen\par - return in 1 month\par - low caloried diet recommended.

## 2022-02-08 LAB
ALBUMIN SERPL ELPH-MCNC: 4.4 G/DL
ALP BLD-CCNC: 104 U/L
ALT SERPL-CCNC: 13 U/L
ANION GAP SERPL CALC-SCNC: 11 MMOL/L
AST SERPL-CCNC: 14 U/L
BASOPHILS # BLD AUTO: 0.04 K/UL
BASOPHILS NFR BLD AUTO: 0.6 %
BILIRUB SERPL-MCNC: 0.3 MG/DL
BUN SERPL-MCNC: 14 MG/DL
CALCIUM SERPL-MCNC: 9.3 MG/DL
CHLORIDE SERPL-SCNC: 105 MMOL/L
CHOLEST SERPL-MCNC: 284 MG/DL
CO2 SERPL-SCNC: 26 MMOL/L
CREAT SERPL-MCNC: 0.6 MG/DL
EOSINOPHIL # BLD AUTO: 0.1 K/UL
EOSINOPHIL NFR BLD AUTO: 1.6 %
ESTIMATED AVERAGE GLUCOSE: 123 MG/DL
FERRITIN SERPL-MCNC: 610 NG/ML
GLUCOSE SERPL-MCNC: 98 MG/DL
HBA1C MFR BLD HPLC: 5.9 %
HBV CORE IGG+IGM SER QL: NONREACTIVE
HBV SURFACE AB SER QL: NONREACTIVE
HBV SURFACE AG SER QL: NONREACTIVE
HCT VFR BLD CALC: 41.6 %
HCV AB SER QL: NONREACTIVE
HCV S/CO RATIO: 0.17 S/CO
HDLC SERPL-MCNC: 50 MG/DL
HEPATITIS A IGG ANTIBODY: REACTIVE
HGB BLD-MCNC: 13.5 G/DL
IMM GRANULOCYTES NFR BLD AUTO: 0.3 %
INR PPP: 1.09 RATIO
IRON SATN MFR SERPL: 39 %
IRON SERPL-MCNC: 104 UG/DL
LDLC SERPL CALC-MCNC: 189 MG/DL
LYMPHOCYTES # BLD AUTO: 2.25 K/UL
LYMPHOCYTES NFR BLD AUTO: 35.1 %
MAN DIFF?: NORMAL
MCHC RBC-ENTMCNC: 30.1 PG
MCHC RBC-ENTMCNC: 32.5 GM/DL
MCV RBC AUTO: 92.9 FL
MONOCYTES # BLD AUTO: 0.48 K/UL
MONOCYTES NFR BLD AUTO: 7.5 %
NEUTROPHILS # BLD AUTO: 3.52 K/UL
NEUTROPHILS NFR BLD AUTO: 54.9 %
NONHDLC SERPL-MCNC: 233 MG/DL
PLATELET # BLD AUTO: 303 K/UL
POTASSIUM SERPL-SCNC: 4.4 MMOL/L
PROT SERPL-MCNC: 7.7 G/DL
PT BLD: 12.8 SEC
RBC # BLD: 4.48 M/UL
RBC # FLD: 13.9 %
SODIUM SERPL-SCNC: 141 MMOL/L
TIBC SERPL-MCNC: 264 UG/DL
TRIGL SERPL-MCNC: 223 MG/DL
UIBC SERPL-MCNC: 161 UG/DL
WBC # FLD AUTO: 6.41 K/UL

## 2022-02-21 ENCOUNTER — OUTPATIENT (OUTPATIENT)
Dept: OUTPATIENT SERVICES | Facility: HOSPITAL | Age: 72
LOS: 1 days | End: 2022-02-21
Payer: MEDICARE

## 2022-02-21 ENCOUNTER — APPOINTMENT (OUTPATIENT)
Dept: ULTRASOUND IMAGING | Facility: CLINIC | Age: 72
End: 2022-02-21
Payer: MEDICARE

## 2022-02-21 DIAGNOSIS — Z98.890 OTHER SPECIFIED POSTPROCEDURAL STATES: Chronic | ICD-10-CM

## 2022-02-21 DIAGNOSIS — E66.9 OBESITY, UNSPECIFIED: ICD-10-CM

## 2022-02-21 DIAGNOSIS — R79.89 OTHER SPECIFIED ABNORMAL FINDINGS OF BLOOD CHEMISTRY: ICD-10-CM

## 2022-02-21 DIAGNOSIS — K76.0 FATTY (CHANGE OF) LIVER, NOT ELSEWHERE CLASSIFIED: ICD-10-CM

## 2022-02-21 PROCEDURE — 76700 US EXAM ABDOM COMPLETE: CPT | Mod: 26

## 2022-02-21 PROCEDURE — 76700 US EXAM ABDOM COMPLETE: CPT

## 2022-03-14 ENCOUNTER — APPOINTMENT (OUTPATIENT)
Dept: HEPATOLOGY | Facility: CLINIC | Age: 72
End: 2022-03-14
Payer: MEDICARE

## 2022-03-14 VITALS
RESPIRATION RATE: 16 BRPM | WEIGHT: 183 LBS | BODY MASS INDEX: 34.55 KG/M2 | HEART RATE: 61 BPM | HEIGHT: 61 IN | OXYGEN SATURATION: 97 % | DIASTOLIC BLOOD PRESSURE: 88 MMHG | SYSTOLIC BLOOD PRESSURE: 142 MMHG | TEMPERATURE: 98.2 F

## 2022-03-14 DIAGNOSIS — R73.02 IMPAIRED GLUCOSE TOLERANCE (ORAL): ICD-10-CM

## 2022-03-14 PROCEDURE — 91200 LIVER ELASTOGRAPHY: CPT

## 2022-03-14 PROCEDURE — 99214 OFFICE O/P EST MOD 30 MIN: CPT

## 2022-03-14 NOTE — ASSESSMENT
[FreeTextEntry1] : Ms. ARANDA is a 72 year old woman with\par \par - NAFLD. MRI wo in 1/2022 showed moderate steatosis, fat fraction 17%\par - obesity\par - glucose intolerance\par \par - LFTs normal, but elevated ferritin 700 ng/mL in 1/2022, normal transferrin saturation. Negative hemochromatosis testing. May be due to BOOTHE.\par - 3/14/22 fibroscan: 3.9 kPa, 350 kPa - no fibrosis; severe steatosis\par - mixed dyslipidemia, triglycerides 223 mg/dL in 2/2022\par - limited exercise tolerance due to hip, knee and ankle pain\par \par - colonoscopy: 2020 elsewhere\par \par Plan:\par - refer to nutritionistTwila\par - low calorie diet recommended\par - return in 1 year after repeat bloodwork and repeat fibroscan
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
## 2022-08-19 NOTE — REASON FOR VISIT
Dillon Hay Patient Age: 86 year old  MESSAGE:   REP from [facility] requesting prior authorization for upcoming surgery/procedure.    Date of surgery/procedure: 9-24    CPT Codes: 79456    Phone number: [phone]  Fax number: [phone]     WEIGHT AND HEIGHT:   Wt Readings from Last 1 Encounters:   07/18/22 68 kg (150 lb)     Ht Readings from Last 1 Encounters:   07/18/22 5' 7\" (1.702 m)     BMI Readings from Last 1 Encounters:   07/18/22 23.49 kg/m²       ALLERGIES:  Amlodipine, Gemfibrozil, and Simvastatin  Current Outpatient Medications   Medication Sig Dispense Refill   • metFORMIN (GLUCOPHAGE) 500 MG tablet TAKE 1 TABLET THREE TIMES DAILY 270 tablet 0   • tamsulosin (FLOMAX) 0.4 MG Cap TAKE 1 CAPSULE BY MOUTH DAILY AFTER A MEAL. 90 capsule 0   • levothyroxine 50 MCG tablet TAKE 1 TABLET EVERY DAY 90 tablet 0   • atorvastatin (LIPITOR) 10 MG tablet TAKE 1 TABLET EVERY DAY 90 tablet 0   • hydroCHLOROthiazide (HYDRODIURIL) 25 MG tablet TAKE 1 TABLET EVERY DAY 90 tablet 0   • Cyanocobalamin (vitamin B-12) 1000 MCG sublingual tablet DISSOLVE 1 TABLET UNDER THE TONGUE DAILY AFTER BREAKFAST. 90 tablet 0   • apixaBAN (ELIQUIS) 2.5 MG Tab Take 1 tablet by mouth in the morning and 1 tablet before bedtime. 60 tablet 0   • FERROUS SULFATE PO Take 1 tablet by mouth daily.     • finasteride (PROSCAR) 5 MG tablet Take 1 tablet by mouth daily. 90 tablet 3   • blood glucose (Accu-Chek Casey Plus) test strip Test blood sugar 2 times daily as directed. Diagnosis: diabetes mellitus. Meter: accu chek casey plus test strips 200 strip 0   • omeprazole (PrilOSEC) 20 MG capsule Take 1 capsule by mouth daily. 90 capsule 3   • glimepiride (AMARYL) 4 MG tablet Take 1 tablet by mouth daily. 90 tablet 0   • empagliflozin (Jardiance) 10 MG tablet Take 1 tablet by mouth daily (before breakfast). 90 tablet 1   • aspirin 81 MG EC tablet Take 1 tablet by mouth daily.     • SOFTCLIX LANCETS Misc USE TO TEST BLOOD SUGAR 2 TIMES A DAY  AS DIRECTED 200 each 2   • Multiple Vitamins-Minerals (MULTIVITAMIN ADULT PO) Take 1 tablet by mouth daily.     • Omega-3 Fatty Acids (FISH OIL PO) Take 1 capsule by mouth daily. Do not start before April 6, 2022.     • sodium chloride 1 g tablet Take 1 g by mouth 2 times daily.       No current facility-administered medications for this visit.     [facility]          Pharmacy preference(s) on file:   [id number] - [place], [place] - [address]  [facility] [address]  Phone: [phone] Fax: [phone]    [facility] (Now [facility]) - [facility] [address]   [address] [address]  Phone: [phone] Fax: [phone]      CALL BACK INFO: Ok to leave response (including medical information) with family member or on answering machine  ROUTING: Patient's physician/staff        PCP: Yevgeniy Pena MD         INS: Payor:  HUMAN MEDICARE ADVANTAGE / Plan: [facility] 076/[id number] / Product Type:  MEDICARE ADVANTAGE   PATIENT ADDRESS:  [address] [place] [id number]   [Post ER] : a post ER visit [Spouse] : spouse [FreeTextEntry1] : for evaluation of abdominal pain and abnormal abdominal imaging.

## 2022-12-05 NOTE — ED PROVIDER NOTE - SKIN, MLM
Tdap; 22-Jul-2021 20:40; Marifer Thompson (RN); [employer]; [id number] (Exp. Date: 28-Jan-2023); IntraMuscular; Deltoid Left.; 0.5 milliLiter(s); VIS (VIS Published: 09-May-2013, VIS Presented: 22-Jul-2021);    Skin normal color for race, warm, dry and intact. No evidence of rash. Melolabial Interpolation Flap Division And Inset Text: Division and inset of the melolabial interpolation flap was performed to achieve optimal aesthetic result, restore normal anatomic appearance and avoid distortion of normal anatomy, expedite and facilitate wound healing, achieve optimal functional result and because linear closure either not possible or would produce suboptimal result. The patient was prepped and draped in the usual manner. The pedicle was infiltrated with local anesthesia. The pedicle was sectioned with a #15 blade. The pedicle was de-bulked and trimmed to match the shape of the defect. Hemostasis was achieved. The flap donor site and free margin of the flap were secured with deep buried sutures and the wound edges were re-approximated.

## 2023-06-29 ENCOUNTER — NON-APPOINTMENT (OUTPATIENT)
Age: 73
End: 2023-06-29

## 2023-06-29 ENCOUNTER — LABORATORY RESULT (OUTPATIENT)
Age: 73
End: 2023-06-29

## 2023-06-29 ENCOUNTER — APPOINTMENT (OUTPATIENT)
Dept: NEUROLOGY | Facility: CLINIC | Age: 73
End: 2023-06-29
Payer: MEDICARE

## 2023-06-29 VITALS
BODY MASS INDEX: 33.23 KG/M2 | WEIGHT: 176 LBS | HEART RATE: 77 BPM | DIASTOLIC BLOOD PRESSURE: 75 MMHG | HEIGHT: 61 IN | SYSTOLIC BLOOD PRESSURE: 127 MMHG

## 2023-06-29 DIAGNOSIS — M48.061 SPINAL STENOSIS, LUMBAR REGION WITHOUT NEUROGENIC CLAUDICATION: ICD-10-CM

## 2023-06-29 DIAGNOSIS — G25.81 RESTLESS LEGS SYNDROME: ICD-10-CM

## 2023-06-29 DIAGNOSIS — G47.00 INSOMNIA, UNSPECIFIED: ICD-10-CM

## 2023-06-29 DIAGNOSIS — R43.0 ANOSMIA: ICD-10-CM

## 2023-06-29 LAB
25(OH)D3 SERPL-MCNC: 15.5 NG/ML
ALBUMIN SERPL ELPH-MCNC: 4.4 G/DL
ALP BLD-CCNC: 109 U/L
ALT SERPL-CCNC: 17 U/L
ANION GAP SERPL CALC-SCNC: 11 MMOL/L
AST SERPL-CCNC: 19 U/L
BILIRUB SERPL-MCNC: 0.4 MG/DL
BUN SERPL-MCNC: 11 MG/DL
CALCIUM SERPL-MCNC: 9.8 MG/DL
CHLORIDE SERPL-SCNC: 106 MMOL/L
CHOLEST SERPL-MCNC: 289 MG/DL
CO2 SERPL-SCNC: 24 MMOL/L
COVID-19 NUCLEOCAPSID  GAM ANTIBODY INTERPRETATION: NEGATIVE
COVID-19 SPIKE DOMAIN ANTIBODY INTERPRETATION: POSITIVE
CREAT SERPL-MCNC: 0.61 MG/DL
CRP SERPL-MCNC: <3 MG/L
EGFR: 94 ML/MIN/1.73M2
ERYTHROCYTE [SEDIMENTATION RATE] IN BLOOD BY WESTERGREN METHOD: 45 MM/HR
FERRITIN SERPL-MCNC: 666 NG/ML
FOLATE SERPL-MCNC: 7.4 NG/ML
GLUCOSE SERPL-MCNC: 99 MG/DL
HCYS SERPL-MCNC: 10.6 UMOL/L
HDLC SERPL-MCNC: 59 MG/DL
IRON SATN MFR SERPL: 31 %
IRON SERPL-MCNC: 86 UG/DL
LDLC SERPL CALC-MCNC: 189 MG/DL
NONHDLC SERPL-MCNC: 230 MG/DL
POTASSIUM SERPL-SCNC: 4.6 MMOL/L
PROT SERPL-MCNC: 7.8 G/DL
SARS-COV-2 AB SERPL IA-ACNC: >250 U/ML
SARS-COV-2 AB SERPL QL IA: 0.09 INDEX
SODIUM SERPL-SCNC: 141 MMOL/L
T PALLIDUM AB SER QL IA: NEGATIVE
T3FREE SERPL-MCNC: 2.54 PG/ML
T4 FREE SERPL-MCNC: 1.2 NG/DL
TIBC SERPL-MCNC: 278 UG/DL
TRIGL SERPL-MCNC: 202 MG/DL
TSH SERPL-ACNC: 2.9 UIU/ML
UIBC SERPL-MCNC: 192 UG/DL
VIT B12 SERPL-MCNC: 474 PG/ML

## 2023-06-29 PROCEDURE — 99204 OFFICE O/P NEW MOD 45 MIN: CPT

## 2023-06-29 NOTE — CONSULT LETTER
[Dear  ___] : Dear  [unfilled], [Consult Letter:] : I had the pleasure of evaluating your patient, [unfilled]. [Please see my note below.] : Please see my note below. [Consult Closing:] : Thank you very much for allowing me to participate in the care of this patient.  If you have any questions, please do not hesitate to contact me. [Sincerely,] : Sincerely, [FreeTextEntry3] : Priyank Martinez MD\par

## 2023-06-29 NOTE — PHYSICAL EXAM
[FreeTextEntry1] : Constitutional:  Patient was well-developed, well-nourished and in no acute distress. \par \par Head:  Normocephalic, atraumatic. Tympanic membranes were clear. \par \par Neck:  Supple with full range of motion. \par \par Cardiovascular:  Cardiac rhythm was regular without murmur. There were no carotid bruits. Peripheral pulses were full and symmetric. \par \par Respiratory:  Lungs were clear. \par \par Abdomen:  Soft and nontender. \par \par Spine:  Nontender.  There was no pain on straight leg raising.  Alek's maneuver was negative.\par \par Skin:  There were no rashes. \par \par NEUROLOGICAL EXAMINATION:\par \par Mental Status: Patient was alert and oriented. Speech was fluent. There was no dysarthria. \par \par Cranial Nerves: \par \par II: She could finger count bilaterally.  Pupils were surgical but reactive. Visual fields were full. Funduscopic examination was normal. \par \par III, IV, VI:  Eye movements were full without nystagmus. \par \par V: Facial sensation was intact. \par \par VII: Facial strength was normal. \par \par VIII: Hearing was equal. \par \par IX, X: Palatal movement was normal. Phonation was normal. \par \par XI: Sternocleidomastoids and trapezii were normal. \par \par XII: Tongue was midline and movements normal. There was no lingual atrophy or fasciculations. \par \par Motor Examination: Muscle bulk, tone and strength were normal.  There was no tremor.\par \par Sensory Examination: Pinprick, vibration and joint position sense were intact. \par \par Reflexes: DTRs were 2 throughout. \par \par Plantar Responses: Plantar responses were flexor. \par \par Coordination/Cerebellar Function: There was no dysmetria on finger to nose or heel to shin testing. \par \par Gait/Stance: Gait was normal.  She swayed on Romberg testing.  Tandem was unsteady.\par \par

## 2023-06-29 NOTE — ASSESSMENT
[FreeTextEntry1] : Mrs. Monroe is a 73-year-old with history of lumbar stenosis.  Her major complaints include insomnia and nocturnal leg restlessness.  I suspect that she suffers from a major sleep disorder including restless leg syndrome.  I will repeat a CBC and blood studies to exclude iron deficiency.  She was intolerant to a dopamine agonist several years ago.  I will prescribe gabapentin 100 to 300 mg at bedtime.  If ineffective, a formal sleep evaluation should be considered.  For completeness, I will arrange an MRI of the lumbar spine.\par \par Regarding her complaint of anosmia and dysgeusia, I will order an MRI of the brain with and without contrast to exclude dysfunction of the olfactory bulb.  I cannot exclude that her symptoms might be due to to COVID-19.\par \par Further management will depend upon the above results and her clinical course.  I will keep you informed of her status.

## 2023-06-29 NOTE — REVIEW OF SYSTEMS
[Leg Weakness] : leg weakness [Abnormal Sensation] : an abnormal sensation [Sleep Disturbances] : sleep disturbances [Negative] : Heme/Lymph
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
## 2023-06-30 LAB
ALBUMIN MFR SERPL ELPH: 54 %
ALBUMIN SERPL-MCNC: 4.2 G/DL
ALBUMIN/GLOB SERPL: 1.2 RATIO
ALPHA1 GLOB MFR SERPL ELPH: 3.4 %
ALPHA1 GLOB SERPL ELPH-MCNC: 0.3 G/DL
ALPHA2 GLOB MFR SERPL ELPH: 9.4 %
ALPHA2 GLOB SERPL ELPH-MCNC: 0.7 G/DL
B-GLOBULIN MFR SERPL ELPH: 11.6 %
B-GLOBULIN SERPL ELPH-MCNC: 0.9 G/DL
DEPRECATED KAPPA LC FREE/LAMBDA SER: 1.29 RATIO
GAMMA GLOB FLD ELPH-MCNC: 1.7 G/DL
GAMMA GLOB MFR SERPL ELPH: 21.6 %
IGA SER QL IEP: 368 MG/DL
IGG SER QL IEP: 1664 MG/DL
IGM SER QL IEP: 89 MG/DL
INTERPRETATION SERPL IEP-IMP: NORMAL
KAPPA LC CSF-MCNC: 2.21 MG/DL
KAPPA LC SERPL-MCNC: 2.86 MG/DL
M PROTEIN SPEC IFE-MCNC: NORMAL
PROT SERPL-MCNC: 7.8 G/DL
PROT SERPL-MCNC: 7.8 G/DL

## 2023-07-03 LAB
ANA PAT FLD IF-IMP: ABNORMAL
ANACR T: ABNORMAL
IGA 24H UR QL IFE: NORMAL
METHYLMALONATE SERPL-SCNC: 162 NMOL/L

## 2023-07-06 LAB
AMPA-R ABCBA: NEGATIVE
AMPHIPHYSIN IGG TITR SER IF: NEGATIVE
ANNOTATION COMMENT IMP: NORMAL
CASPR2-IGG CBA, S: NEGATIVE
CV2 IGG TITR SER: NEGATIVE
GABA-B ABCBA: NEGATIVE
GAD65 AB SER-MCNC: 0 NMOL/L
GLIAL NUC TYPE 1 AB TITR SER: NEGATIVE
HU1 AB TITR SER: NEGATIVE
HU2 AB TITR SER IF: NEGATIVE
HU3 AB TITR SER: NEGATIVE
IGLON5 IFA, S: NEGATIVE
IMMUNOLOGIST REVIEW: NORMAL
LGI1-IGG CBA, S: NEGATIVE
NIF IFA, S: NEGATIVE
NMDA-R ABCBA: NEGATIVE
PCA-1 AB TITR SER: NEGATIVE
PCA-2 AB TITR SER: NEGATIVE
PCA-TR AB TITR SER: NEGATIVE

## 2023-07-10 LAB — VIT B1 SERPL-MCNC: 106 NMOL/L

## 2023-07-21 ENCOUNTER — OUTPATIENT (OUTPATIENT)
Dept: OUTPATIENT SERVICES | Facility: HOSPITAL | Age: 73
LOS: 1 days | End: 2023-07-21
Payer: MEDICARE

## 2023-07-21 ENCOUNTER — APPOINTMENT (OUTPATIENT)
Dept: MRI IMAGING | Facility: CLINIC | Age: 73
End: 2023-07-21
Payer: MEDICARE

## 2023-07-21 DIAGNOSIS — Z98.890 OTHER SPECIFIED POSTPROCEDURAL STATES: Chronic | ICD-10-CM

## 2023-07-21 DIAGNOSIS — R43.0 ANOSMIA: ICD-10-CM

## 2023-07-21 PROCEDURE — 72148 MRI LUMBAR SPINE W/O DYE: CPT

## 2023-07-21 PROCEDURE — 72148 MRI LUMBAR SPINE W/O DYE: CPT | Mod: 26

## 2023-07-26 ENCOUNTER — APPOINTMENT (OUTPATIENT)
Dept: MRI IMAGING | Facility: CLINIC | Age: 73
End: 2023-07-26
Payer: MEDICARE

## 2023-07-26 ENCOUNTER — OUTPATIENT (OUTPATIENT)
Dept: OUTPATIENT SERVICES | Facility: HOSPITAL | Age: 73
LOS: 1 days | End: 2023-07-26
Payer: MEDICARE

## 2023-07-26 DIAGNOSIS — Z98.890 OTHER SPECIFIED POSTPROCEDURAL STATES: Chronic | ICD-10-CM

## 2023-07-26 DIAGNOSIS — R43.0 ANOSMIA: ICD-10-CM

## 2023-07-26 PROCEDURE — 70553 MRI BRAIN STEM W/O & W/DYE: CPT

## 2023-07-26 PROCEDURE — 70553 MRI BRAIN STEM W/O & W/DYE: CPT | Mod: 26

## 2023-07-26 PROCEDURE — A9585: CPT

## 2023-08-16 ENCOUNTER — INPATIENT (INPATIENT)
Facility: HOSPITAL | Age: 73
LOS: 2 days | Discharge: HOME | End: 2023-08-19
Attending: SURGERY | Admitting: SURGERY
Payer: MEDICARE

## 2023-08-16 VITALS
DIASTOLIC BLOOD PRESSURE: 77 MMHG | SYSTOLIC BLOOD PRESSURE: 135 MMHG | TEMPERATURE: 99 F | OXYGEN SATURATION: 96 % | RESPIRATION RATE: 16 BRPM | HEART RATE: 44 BPM

## 2023-08-16 DIAGNOSIS — Z98.890 OTHER SPECIFIED POSTPROCEDURAL STATES: Chronic | ICD-10-CM

## 2023-08-16 LAB
ALBUMIN SERPL ELPH-MCNC: 4.1 G/DL — SIGNIFICANT CHANGE UP (ref 3.3–5)
ALP SERPL-CCNC: 544 U/L — HIGH (ref 40–120)
ALT FLD-CCNC: 154 U/L — HIGH (ref 4–33)
ANION GAP SERPL CALC-SCNC: 11 MMOL/L — SIGNIFICANT CHANGE UP (ref 7–14)
AST SERPL-CCNC: 107 U/L — HIGH (ref 4–32)
BASE EXCESS BLDV CALC-SCNC: 2.3 MMOL/L — SIGNIFICANT CHANGE UP (ref -2–3)
BASOPHILS # BLD AUTO: 0.03 K/UL — SIGNIFICANT CHANGE UP (ref 0–0.2)
BASOPHILS NFR BLD AUTO: 0.3 % — SIGNIFICANT CHANGE UP (ref 0–2)
BILIRUB SERPL-MCNC: 3 MG/DL — HIGH (ref 0.2–1.2)
BLOOD GAS VENOUS COMPREHENSIVE RESULT: SIGNIFICANT CHANGE UP
BUN SERPL-MCNC: 13 MG/DL — SIGNIFICANT CHANGE UP (ref 7–23)
CALCIUM SERPL-MCNC: 9.4 MG/DL — SIGNIFICANT CHANGE UP (ref 8.4–10.5)
CHLORIDE BLDV-SCNC: 105 MMOL/L — SIGNIFICANT CHANGE UP (ref 96–108)
CHLORIDE SERPL-SCNC: 102 MMOL/L — SIGNIFICANT CHANGE UP (ref 98–107)
CO2 BLDV-SCNC: 31.2 MMOL/L — HIGH (ref 22–26)
CO2 SERPL-SCNC: 26 MMOL/L — SIGNIFICANT CHANGE UP (ref 22–31)
CREAT SERPL-MCNC: 0.57 MG/DL — SIGNIFICANT CHANGE UP (ref 0.5–1.3)
EGFR: 95 ML/MIN/1.73M2 — SIGNIFICANT CHANGE UP
EOSINOPHIL # BLD AUTO: 0.08 K/UL — SIGNIFICANT CHANGE UP (ref 0–0.5)
EOSINOPHIL NFR BLD AUTO: 0.7 % — SIGNIFICANT CHANGE UP (ref 0–6)
GAS PNL BLDV: 136 MMOL/L — SIGNIFICANT CHANGE UP (ref 136–145)
GLUCOSE BLDV-MCNC: 191 MG/DL — HIGH (ref 70–99)
GLUCOSE SERPL-MCNC: 170 MG/DL — HIGH (ref 70–99)
HCO3 BLDV-SCNC: 30 MMOL/L — HIGH (ref 22–29)
HCT VFR BLD CALC: 40.1 % — SIGNIFICANT CHANGE UP (ref 34.5–45)
HCT VFR BLDA CALC: 41 % — SIGNIFICANT CHANGE UP (ref 34.5–46.5)
HGB BLD CALC-MCNC: 13.7 G/DL — SIGNIFICANT CHANGE UP (ref 11.7–16.1)
HGB BLD-MCNC: 13.3 G/DL — SIGNIFICANT CHANGE UP (ref 11.5–15.5)
IANC: 8.71 K/UL — HIGH (ref 1.8–7.4)
IMM GRANULOCYTES NFR BLD AUTO: 0.4 % — SIGNIFICANT CHANGE UP (ref 0–0.9)
LACTATE BLDV-MCNC: 1.6 MMOL/L — SIGNIFICANT CHANGE UP (ref 0.5–2)
LIDOCAIN IGE QN: 29 U/L — SIGNIFICANT CHANGE UP (ref 7–60)
LYMPHOCYTES # BLD AUTO: 1.57 K/UL — SIGNIFICANT CHANGE UP (ref 1–3.3)
LYMPHOCYTES # BLD AUTO: 14.2 % — SIGNIFICANT CHANGE UP (ref 13–44)
MCHC RBC-ENTMCNC: 30.3 PG — SIGNIFICANT CHANGE UP (ref 27–34)
MCHC RBC-ENTMCNC: 33.2 GM/DL — SIGNIFICANT CHANGE UP (ref 32–36)
MCV RBC AUTO: 91.3 FL — SIGNIFICANT CHANGE UP (ref 80–100)
MONOCYTES # BLD AUTO: 0.61 K/UL — SIGNIFICANT CHANGE UP (ref 0–0.9)
MONOCYTES NFR BLD AUTO: 5.5 % — SIGNIFICANT CHANGE UP (ref 2–14)
NEUTROPHILS # BLD AUTO: 8.71 K/UL — HIGH (ref 1.8–7.4)
NEUTROPHILS NFR BLD AUTO: 78.9 % — HIGH (ref 43–77)
NRBC # BLD: 0 /100 WBCS — SIGNIFICANT CHANGE UP (ref 0–0)
NRBC # FLD: 0 K/UL — SIGNIFICANT CHANGE UP (ref 0–0)
PCO2 BLDV: 56 MMHG — HIGH (ref 39–52)
PH BLDV: 7.33 — SIGNIFICANT CHANGE UP (ref 7.32–7.43)
PLATELET # BLD AUTO: 284 K/UL — SIGNIFICANT CHANGE UP (ref 150–400)
PO2 BLDV: 25 MMHG — SIGNIFICANT CHANGE UP (ref 25–45)
POTASSIUM BLDV-SCNC: 4 MMOL/L — SIGNIFICANT CHANGE UP (ref 3.5–5.1)
POTASSIUM SERPL-MCNC: 4 MMOL/L — SIGNIFICANT CHANGE UP (ref 3.5–5.3)
POTASSIUM SERPL-SCNC: 4 MMOL/L — SIGNIFICANT CHANGE UP (ref 3.5–5.3)
PROT SERPL-MCNC: 8.2 G/DL — SIGNIFICANT CHANGE UP (ref 6–8.3)
RBC # BLD: 4.39 M/UL — SIGNIFICANT CHANGE UP (ref 3.8–5.2)
RBC # FLD: 14.9 % — HIGH (ref 10.3–14.5)
SAO2 % BLDV: 36.3 % — LOW (ref 67–88)
SODIUM SERPL-SCNC: 139 MMOL/L — SIGNIFICANT CHANGE UP (ref 135–145)
TROPONIN T, HIGH SENSITIVITY RESULT: <6 NG/L — SIGNIFICANT CHANGE UP
WBC # BLD: 11.04 K/UL — HIGH (ref 3.8–10.5)
WBC # FLD AUTO: 11.04 K/UL — HIGH (ref 3.8–10.5)

## 2023-08-16 PROCEDURE — 99291 CRITICAL CARE FIRST HOUR: CPT

## 2023-08-16 PROCEDURE — 74174 CTA ABD&PLVS W/CONTRAST: CPT | Mod: 26,MA

## 2023-08-16 PROCEDURE — 71045 X-RAY EXAM CHEST 1 VIEW: CPT | Mod: 26

## 2023-08-16 PROCEDURE — 71275 CT ANGIOGRAPHY CHEST: CPT | Mod: 26,MA

## 2023-08-16 RX ORDER — SODIUM CHLORIDE 9 MG/ML
1000 INJECTION INTRAMUSCULAR; INTRAVENOUS; SUBCUTANEOUS ONCE
Refills: 0 | Status: COMPLETED | OUTPATIENT
Start: 2023-08-16 | End: 2023-08-16

## 2023-08-16 RX ORDER — ONDANSETRON 8 MG/1
4 TABLET, FILM COATED ORAL ONCE
Refills: 0 | Status: COMPLETED | OUTPATIENT
Start: 2023-08-16 | End: 2023-08-16

## 2023-08-16 RX ORDER — SUCRALFATE 1 G
1 TABLET ORAL ONCE
Refills: 0 | Status: COMPLETED | OUTPATIENT
Start: 2023-08-16 | End: 2023-08-16

## 2023-08-16 RX ORDER — ACETAMINOPHEN 500 MG
1000 TABLET ORAL ONCE
Refills: 0 | Status: COMPLETED | OUTPATIENT
Start: 2023-08-16 | End: 2023-08-16

## 2023-08-16 RX ORDER — FAMOTIDINE 10 MG/ML
20 INJECTION INTRAVENOUS ONCE
Refills: 0 | Status: COMPLETED | OUTPATIENT
Start: 2023-08-16 | End: 2023-08-16

## 2023-08-16 RX ADMIN — Medication 400 MILLIGRAM(S): at 20:55

## 2023-08-16 RX ADMIN — SODIUM CHLORIDE 1000 MILLILITER(S): 9 INJECTION INTRAMUSCULAR; INTRAVENOUS; SUBCUTANEOUS at 20:55

## 2023-08-16 RX ADMIN — Medication 1 GRAM(S): at 21:09

## 2023-08-16 RX ADMIN — FAMOTIDINE 20 MILLIGRAM(S): 10 INJECTION INTRAVENOUS at 20:55

## 2023-08-16 RX ADMIN — ONDANSETRON 4 MILLIGRAM(S): 8 TABLET, FILM COATED ORAL at 23:23

## 2023-08-16 NOTE — ED PROVIDER NOTE - CARE PLAN
Principal Discharge DX:	Abdominal pain   1 Principal Discharge DX:	CL (cholelithiasis)  Secondary Diagnosis:	Hyperbilirubinemia

## 2023-08-16 NOTE — ED PROVIDER NOTE - PROGRESS NOTE DETAILS
Dr. Chito Allen DO (ED ATTENDING):  incr LFTs and bilirubin. CT findings noted with biliary ductal dilation and gallstone without obvious acute cholecystitis  surgery team consulted and plan to accept admission

## 2023-08-16 NOTE — ED PROVIDER NOTE - ATTENDING CONTRIBUTION TO CARE
73 year old with pmh gallstones and pulm fibrosis presents with ruq pain for 3 days. concern for billiary colic vs cholecystitis vs hepatitis vs pancreatitis vs pna will obtain lipase to rule out pancreatitis will get trop and ekg to rule out ACS. patient found to be in sinus diego with normal bp.  Will obtain cbc to rule out anemia. will obtain angio of chest abd pelvis to rule out dissection given the new bradycardia, dissection into coronaries is a concern. will also rule out intrabdominal abscess with this ct. pain control, tele monitor. surg cx. admit

## 2023-08-16 NOTE — ED ADULT TRIAGE NOTE - CHIEF COMPLAINT QUOTE
Patient c/o epigastric pain, sweating and palpitations x 2 days. Endorsing dizziness and weakness. Denies chest pain, shortness of breath, fever, cough, vomiting, diarrhea. Bradycardic, patient denies history of bradycardia. Phx pulmonary fibrosis, HTN, HLD, diverticulitis, gallstones

## 2023-08-16 NOTE — ED PROVIDER NOTE - PHYSICAL EXAMINATION
GENERAL: no acute distress, non-toxic appearing  HEAD: normocephalic, atraumatic  HEENT: PERRLA, EOMI, normal conjunctiva, oral mucosa moist  CARDIAC: regular rate and rhythm, no appreciable murmurs  PULM: clear to ascultation bilaterally, no crackles, rales, rhonchi, or wheezing  GI: abdomen nondistended, soft, +epigastric and RUQ tenderness, no guarding or rebound tenderness  : nno suprapubic tenderness  NEURO: alert and oriented x 3, normal speech, no gross neurologic deficit  MSK: no visible deformities  SKIN: no visible rashes, dry, well-perfused  PSYCH: appropriate mood and affect

## 2023-08-16 NOTE — ED PROVIDER NOTE - OBJECTIVE STATEMENT
Patient is 73-year-old history of diverticulitis, gallstones, presenting with abdominal pain.  Patient states pain started in June has not it on and off since, wants to go with antibiotics which made felt better.  4 days ago patient started having pain again, mostly upper abdominal pain epigastric and right-sided.  Pain is usually worse after eating however has pain constantly.  Also feels diaphoretic, no no fevers.  Denies vomiting or nausea, chest pain with eating.  Denies chest pain shortness of breath, trauma, recent surgeries.

## 2023-08-16 NOTE — ED ADULT NURSE NOTE - OBJECTIVE STATEMENT
Pt awake and alert Phx HTN presenting to ED for RUQ abdominal pain starting 1 week ago. Pt appears pale, and diaphoretic. Bradycardic on monitor, MD at bedside and aware. + tenderness to RUQ. Denies cp, SOB, N V D. Call bell at bedside.

## 2023-08-16 NOTE — ED PROVIDER NOTE - CLINICAL SUMMARY MEDICAL DECISION MAKING FREE TEXT BOX
Patient is 73-year-old history of diverticulitis, gallstones, presenting with abdominal pain.  Concern for possible cholecystitis versus peptic ulcer disease versus pancreatitis.  Patient abdomen soft, nondistended, do not suspect perforation.  Diaphoretic, bradycardic, syncope at home few weeks ago.  Will get EKG, Trop, chest x-ray, rule out ACS.  Will get CTA, rule out dissection. Will give meds and reassess.

## 2023-08-17 ENCOUNTER — TRANSCRIPTION ENCOUNTER (OUTPATIENT)
Age: 73
End: 2023-08-17

## 2023-08-17 ENCOUNTER — RESULT REVIEW (OUTPATIENT)
Age: 73
End: 2023-08-17

## 2023-08-17 DIAGNOSIS — K80.20 CALCULUS OF GALLBLADDER WITHOUT CHOLECYSTITIS WITHOUT OBSTRUCTION: ICD-10-CM

## 2023-08-17 LAB
ALBUMIN SERPL ELPH-MCNC: 3.7 G/DL — SIGNIFICANT CHANGE UP (ref 3.3–5)
ALP SERPL-CCNC: 551 U/L — HIGH (ref 40–120)
ALT FLD-CCNC: 169 U/L — HIGH (ref 4–33)
ANION GAP SERPL CALC-SCNC: 12 MMOL/L — SIGNIFICANT CHANGE UP (ref 7–14)
APTT BLD: 33.2 SEC — SIGNIFICANT CHANGE UP (ref 24.5–35.6)
AST SERPL-CCNC: 113 U/L — HIGH (ref 4–32)
BILIRUB DIRECT SERPL-MCNC: 3.6 MG/DL — HIGH (ref 0–0.3)
BILIRUB INDIRECT FLD-MCNC: 1 MG/DL — SIGNIFICANT CHANGE UP (ref 0–1)
BILIRUB SERPL-MCNC: 4.6 MG/DL — HIGH (ref 0.2–1.2)
BUN SERPL-MCNC: 8 MG/DL — SIGNIFICANT CHANGE UP (ref 7–23)
CALCIUM SERPL-MCNC: 8.9 MG/DL — SIGNIFICANT CHANGE UP (ref 8.4–10.5)
CHLORIDE SERPL-SCNC: 101 MMOL/L — SIGNIFICANT CHANGE UP (ref 98–107)
CO2 SERPL-SCNC: 20 MMOL/L — LOW (ref 22–31)
CREAT SERPL-MCNC: 0.44 MG/DL — LOW (ref 0.5–1.3)
EGFR: 101 ML/MIN/1.73M2 — SIGNIFICANT CHANGE UP
GLUCOSE SERPL-MCNC: 145 MG/DL — HIGH (ref 70–99)
HCT VFR BLD CALC: 39.6 % — SIGNIFICANT CHANGE UP (ref 34.5–45)
HCV AB S/CO SERPL IA: 0.12 S/CO — SIGNIFICANT CHANGE UP (ref 0–0.99)
HCV AB SERPL-IMP: SIGNIFICANT CHANGE UP
HGB BLD-MCNC: 13.4 G/DL — SIGNIFICANT CHANGE UP (ref 11.5–15.5)
INR BLD: 1.05 RATIO — SIGNIFICANT CHANGE UP (ref 0.85–1.18)
MAGNESIUM SERPL-MCNC: 1.9 MG/DL — SIGNIFICANT CHANGE UP (ref 1.6–2.6)
MCHC RBC-ENTMCNC: 30.1 PG — SIGNIFICANT CHANGE UP (ref 27–34)
MCHC RBC-ENTMCNC: 33.8 GM/DL — SIGNIFICANT CHANGE UP (ref 32–36)
MCV RBC AUTO: 89 FL — SIGNIFICANT CHANGE UP (ref 80–100)
NRBC # BLD: 0 /100 WBCS — SIGNIFICANT CHANGE UP (ref 0–0)
NRBC # FLD: 0 K/UL — SIGNIFICANT CHANGE UP (ref 0–0)
PHOSPHATE SERPL-MCNC: 2.8 MG/DL — SIGNIFICANT CHANGE UP (ref 2.5–4.5)
PLATELET # BLD AUTO: 277 K/UL — SIGNIFICANT CHANGE UP (ref 150–400)
POTASSIUM SERPL-MCNC: 4.2 MMOL/L — SIGNIFICANT CHANGE UP (ref 3.5–5.3)
POTASSIUM SERPL-SCNC: 4.2 MMOL/L — SIGNIFICANT CHANGE UP (ref 3.5–5.3)
PROT SERPL-MCNC: 7.6 G/DL — SIGNIFICANT CHANGE UP (ref 6–8.3)
PROTHROM AB SERPL-ACNC: 11.9 SEC — SIGNIFICANT CHANGE UP (ref 9.5–13)
RBC # BLD: 4.45 M/UL — SIGNIFICANT CHANGE UP (ref 3.8–5.2)
RBC # FLD: 14.7 % — HIGH (ref 10.3–14.5)
SODIUM SERPL-SCNC: 133 MMOL/L — LOW (ref 135–145)
WBC # BLD: 14.93 K/UL — HIGH (ref 3.8–10.5)
WBC # FLD AUTO: 14.93 K/UL — HIGH (ref 3.8–10.5)

## 2023-08-17 PROCEDURE — 99222 1ST HOSP IP/OBS MODERATE 55: CPT | Mod: 57

## 2023-08-17 PROCEDURE — 88305 TISSUE EXAM BY PATHOLOGIST: CPT | Mod: 26

## 2023-08-17 PROCEDURE — 43264 ERCP REMOVE DUCT CALCULI: CPT | Mod: GC

## 2023-08-17 PROCEDURE — 99222 1ST HOSP IP/OBS MODERATE 55: CPT | Mod: GC,25

## 2023-08-17 PROCEDURE — 99223 1ST HOSP IP/OBS HIGH 75: CPT

## 2023-08-17 PROCEDURE — 43239 EGD BIOPSY SINGLE/MULTIPLE: CPT | Mod: GC

## 2023-08-17 PROCEDURE — 74330 X-RAY BILE/PANC ENDOSCOPY: CPT | Mod: 26,GC

## 2023-08-17 PROCEDURE — 43274 ERCP DUCT STENT PLACEMENT: CPT | Mod: GC

## 2023-08-17 DEVICE — STENT BIL ADVANIX 10FRX7CM PRELOADED: Type: IMPLANTABLE DEVICE | Status: FUNCTIONAL

## 2023-08-17 DEVICE — GWIRE JAGTOME REVOLUTION RX 260CM/0.025IN: Type: IMPLANTABLE DEVICE | Status: FUNCTIONAL

## 2023-08-17 DEVICE — CATH BLLN EXTRACT DIST GUIDE WIRE 15MM 3LUM: Type: IMPLANTABLE DEVICE | Status: FUNCTIONAL

## 2023-08-17 RX ORDER — PIPERACILLIN AND TAZOBACTAM 4; .5 G/20ML; G/20ML
3.38 INJECTION, POWDER, LYOPHILIZED, FOR SOLUTION INTRAVENOUS ONCE
Refills: 0 | Status: COMPLETED | OUTPATIENT
Start: 2023-08-17 | End: 2023-08-17

## 2023-08-17 RX ORDER — ATORVASTATIN CALCIUM 80 MG/1
80 TABLET, FILM COATED ORAL AT BEDTIME
Refills: 0 | Status: DISCONTINUED | OUTPATIENT
Start: 2023-08-17 | End: 2023-08-19

## 2023-08-17 RX ORDER — PIPERACILLIN AND TAZOBACTAM 4; .5 G/20ML; G/20ML
3.38 INJECTION, POWDER, LYOPHILIZED, FOR SOLUTION INTRAVENOUS EVERY 8 HOURS
Refills: 0 | Status: DISCONTINUED | OUTPATIENT
Start: 2023-08-17 | End: 2023-08-19

## 2023-08-17 RX ORDER — MAGNESIUM SULFATE 500 MG/ML
1 VIAL (ML) INJECTION ONCE
Refills: 0 | Status: COMPLETED | OUTPATIENT
Start: 2023-08-17 | End: 2023-08-17

## 2023-08-17 RX ORDER — SODIUM CHLORIDE 9 MG/ML
1000 INJECTION, SOLUTION INTRAVENOUS
Refills: 0 | Status: DISCONTINUED | OUTPATIENT
Start: 2023-08-17 | End: 2023-08-18

## 2023-08-17 RX ORDER — ENOXAPARIN SODIUM 100 MG/ML
40 INJECTION SUBCUTANEOUS EVERY 24 HOURS
Refills: 0 | Status: DISCONTINUED | OUTPATIENT
Start: 2023-08-17 | End: 2023-08-19

## 2023-08-17 RX ORDER — ACETAMINOPHEN 500 MG
1000 TABLET ORAL EVERY 6 HOURS
Refills: 0 | Status: DISCONTINUED | OUTPATIENT
Start: 2023-08-17 | End: 2023-08-19

## 2023-08-17 RX ADMIN — Medication 100 GRAM(S): at 09:43

## 2023-08-17 RX ADMIN — PIPERACILLIN AND TAZOBACTAM 200 GRAM(S): 4; .5 INJECTION, POWDER, LYOPHILIZED, FOR SOLUTION INTRAVENOUS at 03:10

## 2023-08-17 RX ADMIN — PIPERACILLIN AND TAZOBACTAM 25 GRAM(S): 4; .5 INJECTION, POWDER, LYOPHILIZED, FOR SOLUTION INTRAVENOUS at 05:37

## 2023-08-17 RX ADMIN — Medication 63.75 MILLIMOLE(S): at 15:29

## 2023-08-17 RX ADMIN — PIPERACILLIN AND TAZOBACTAM 25 GRAM(S): 4; .5 INJECTION, POWDER, LYOPHILIZED, FOR SOLUTION INTRAVENOUS at 15:28

## 2023-08-17 RX ADMIN — Medication 1000 MILLIGRAM(S): at 07:23

## 2023-08-17 RX ADMIN — Medication 400 MILLIGRAM(S): at 06:53

## 2023-08-17 RX ADMIN — PIPERACILLIN AND TAZOBACTAM 25 GRAM(S): 4; .5 INJECTION, POWDER, LYOPHILIZED, FOR SOLUTION INTRAVENOUS at 22:11

## 2023-08-17 NOTE — CONSULT NOTE ADULT - SUBJECTIVE AND OBJECTIVE BOX
CHIEF COMPLAINT: epigastric pain    HPI:  72YO Female PMH ILD (no o2, not on antifibrotics), HLD, ovarian cystectomy who was admitted 8/17 with 3 days of epigastric pain.     Patient states pain started in June has not it on and off since. Endorses pain in the RUQ that began 3 days ago, associated with nausea and diarrhea but no fevers or chills. Has had a few similar episodes in the past. Pain is usually worse after eating however has pain constantly.  Also feels diaphoretic, no no fevers.  Denied Chest pain or SOB.     In ED pt was febrile to 100.9 but otherwise hemodynamically stable saturating 96% on room air. Labs notbale for leukocytosis to 14.93, Bili 4.6 w/ direct 3.6, ALk phos 551, , , VBG 7.33/56. CT showing cholelithiasis w/ 1cm stone in CBD with upstream ductal dilitation and no evidence of cholesystitis. Additionally it showed ?diverticulitis. CT chest showed reticular pattern w/ traction bronchiectasis and no honey combing concerning for pulmonary fibrosis.     Pulmonary consulted for optimization in the setting of fibrosing ILD.    PAST MEDICAL & SURGICAL HISTORY:  Pulmonary fibrosis  Hyperlipidemia  Hypertension  S/P ovarian cystectomy    FAMILY HISTORY:      SOCIAL HISTORY:  Smoking: [ ] Never Smoked [ ] Former Smoker (__ packs x ___ years) [ ] Current Smoker  (__ packs x ___ years)  Substance Use: [ ] Never Used [ ] Used ____  EtOH Use:  Marital Status: [ ] Single [ ]  [ ]  [ ]   Sexual History:   Occupation:  Recent Travel:  Country of Birth:  Advance Directives:    Allergies    No Known Allergies    Intolerances        HOME MEDICATIONS:  Home Medications:  aspirin 81 mg oral tablet: 1 orally once a day (17 Aug 2023 03:08)  rosuvastatin 20 mg oral tablet: 1 orally once a day (17 Aug 2023 03:07)      REVIEW OF SYSTEMS:  Constitutional: [ ] negative [ ] fevers [ ] chills [ ] weight loss [ ] weight gain  HEENT: [ ] negative [ ] dry eyes [ ] eye irritation [ ] postnasal drip [ ] nasal congestion  CV: [ ] negative  [ ] chest pain [ ] orthopnea [ ] palpitations [ ] murmur  Resp: [ ] negative [ ] cough [ ] shortness of breath [ ] dyspnea [ ] wheezing [ ] sputum [ ] hemoptysis  GI: [ ] negative [ ] nausea [ ] vomiting [ ] diarrhea [ ] constipation [ ] abd pain [ ] dysphagia   : [ ] negative [ ] dysuria [ ] nocturia [ ] hematuria [ ] increased urinary frequency  Musculoskeletal: [ ] negative [ ] back pain [ ] myalgias [ ] arthralgias [ ] fracture  Skin: [ ] negative [ ] rash [ ] itch  Neurological: [ ] negative [ ] headache [ ] dizziness [ ] syncope [ ] weakness [ ] numbness  Psychiatric: [ ] negative [ ] anxiety [ ] depression  Endocrine: [ ] negative [ ] diabetes [ ] thyroid problem  Hematologic/Lymphatic: [ ] negative [ ] anemia [ ] bleeding problem  Allergic/Immunologic: [ ] negative [ ] itchy eyes [ ] nasal discharge [ ] hives [ ] angioedema  [ ] All other systems negative  [ ] Unable to assess ROS because ________    OBJECTIVE:  ICU Vital Signs Last 24 Hrs  T(C): 37.1 (17 Aug 2023 07:39), Max: 38.3 (17 Aug 2023 06:04)  T(F): 98.8 (17 Aug 2023 07:39), Max: 100.9 (17 Aug 2023 06:04)  HR: 70 (17 Aug 2023 06:04) (39 - 76)  BP: 134/70 (17 Aug 2023 06:04) (132/74 - 157/80)  BP(mean): --  ABP: --  ABP(mean): --  RR: 18 (17 Aug 2023 06:04) (16 - 18)  SpO2: 96% (17 Aug 2023 06:04) (96% - 100%)    O2 Parameters below as of 17 Aug 2023 06:04  Patient On (Oxygen Delivery Method): room air              08-16 @ 07:01  -  08-17 @ 07:00  --------------------------------------------------------  IN: 825 mL / OUT: 0 mL / NET: 825 mL      CAPILLARY BLOOD GLUCOSE          PHYSICAL EXAM:  General:   HEENT:   Lymph Nodes:  Neck:   Respiratory:   Cardiovascular:   Abdomen:   Extremities:   Skin:   Neurological:  Psychiatry:    LINES:     HOSPITAL MEDICATIONS:  Standing Meds:  atorvastatin 80 milliGRAM(s) Oral at bedtime  enoxaparin Injectable 40 milliGRAM(s) SubCutaneous every 24 hours  lactated ringers. 1000 milliLiter(s) IV Continuous <Continuous>  magnesium sulfate  IVPB 1 Gram(s) IV Intermittent once  piperacillin/tazobactam IVPB.. 3.375 Gram(s) IV Intermittent every 8 hours  sodium phosphate 15 milliMole(s)/250 mL IVPB 15 milliMole(s) IV Intermittent once      PRN Meds:  acetaminophen   IVPB .. 1000 milliGRAM(s) IV Intermittent every 6 hours PRN      LABS:                        13.4   14.93 )-----------( 277      ( 17 Aug 2023 04:57 )             39.6     Hgb Trend: 13.4<--, 13.3<--  08-17    133<L>  |  101  |  8   ----------------------------<  145<H>  4.2   |  20<L>  |  0.44<L>    Ca    8.9      17 Aug 2023 04:57  Phos  2.8     08-17  Mg     1.90     08-17    TPro  7.6  /  Alb  3.7  /  TBili  4.6<H>  /  DBili  3.6<H>  /  AST  113<H>  /  ALT  169<H>  /  AlkPhos  551<H>  08-17    Creatinine Trend: 0.44<--, 0.57<--  PT/INR - ( 17 Aug 2023 04:57 )   PT: 11.9 sec;   INR: 1.05 ratio         PTT - ( 17 Aug 2023 04:57 )  PTT:33.2 sec  Urinalysis Basic - ( 17 Aug 2023 04:57 )    Color: x / Appearance: x / SG: x / pH: x  Gluc: 145 mg/dL / Ketone: x  / Bili: x / Urobili: x   Blood: x / Protein: x / Nitrite: x   Leuk Esterase: x / RBC: x / WBC x   Sq Epi: x / Non Sq Epi: x / Bacteria: x        Venous Blood Gas:  08-16 @ 21:07  7.33/56/25/30/36.3  VBG Lactate: 1.6      MICROBIOLOGY:       RADIOLOGY:  [ ] Reviewed and interpreted by me    PULMONARY FUNCTION TESTS:    EKG: CHIEF COMPLAINT: epigastric pain    HPI:  Patient is a 74 yo F w/ pulmonary fibrosis, HLD, ovarian cystectomy who was admitted 8/17 with 3 days of epigastric pain. Patient found to have choledocholithiasis after CT showing cholelithiasis w/ 1cm stone in CBD with upstream ductal dilitation and no evidence of cholesystitis. Pulmonary consulted for preoperative evaluation after CT chest showed bilateral peripheral reticular pattern, GGO and traction bronchiectasis, most notable in RML and LLL.     As per patient and  at bedside, patient was incidentally found to have pulmonary fibrosis in 2019 after routine CXR in [place]. She was referred to Dr. Cunha (Pulm) and had PFTs done and was told her disease was mild with plan to monitor off medication. However, she was lost to follow up since 2020 due to the pandemic. She endorses noticing no change in her breathing although her ET has been limited by back and joint pains. Endorses can walk 2 blocks before stopping due to pain. Endorses intermittent dry cough. Endorses ankle, knee and back pains. Denies any rash. Mother also had lung disease but unknown which.     Patient currently breathing comfortably and saturating well on room air. Labs notable for leukocytosis to 14.93, Bili 4.6 w/ direct 3.6, ALk phos 551, , , VBG 7.33/56. CT showing cholelithiasis w/ 1cm stone in CBD with upstream ductal dilitation and no evidence of cholesystitis. Additionally it showed ?diverticulitis. CT chest showed reticular pattern w/ traction bronchiectasis and no honey combing concerning for pulmonary fibrosis.     PAST MEDICAL & SURGICAL HISTORY:  Pulmonary fibrosis  Hyperlipidemia  Hypertension  S/P ovarian cystectomy    FAMILY HISTORY:      SOCIAL HISTORY:  Smoking: [X] Never Smoked [ ] Former Smoker (__ packs x ___ years) [ ] Current Smoker  (__ packs x ___ years)  Substance Use: [X] Never Used [ ] Used ____  EtOH Use:  Marital Status: [ ] Single [X]  [ ]  [ ]   Sexual History:   Occupation: Retired teacher  Recent Travel:  Country of Birth: [place]  Advance Directives:    Allergies    No Known Allergies    Intolerances        HOME MEDICATIONS:  Home Medications:  aspirin 81 mg oral tablet: 1 orally once a day (17 Aug 2023 03:08)  rosuvastatin 20 mg oral tablet: 1 orally once a day (17 Aug 2023 03:07)      REVIEW OF SYSTEMS:  Constitutional: [ ] negative [ ] fevers [ ] chills [ ] weight loss [ ] weight gain  HEENT: [ ] negative [ ] dry eyes [ ] eye irritation [ ] postnasal drip [ ] nasal congestion  CV: [ ] negative  [ ] chest pain [ ] orthopnea [ ] palpitations [ ] murmur  Resp: [ ] negative [X] cough [ ] shortness of breath [ ] dyspnea [ ] wheezing [ ] sputum [ ] hemoptysis  GI: [ ] negative [ ] nausea [ ] vomiting [ ] diarrhea [ ] constipation [X] abd pain [ ] dysphagia   : [ ] negative [ ] dysuria [ ] nocturia [ ] hematuria [ ] increased urinary frequency  Musculoskeletal: [ ] negative [X] back pain [ ] myalgias [X] arthralgias [ ] fracture  Skin: [ ] negative [ ] rash [ ] itch  Neurological: [ ] negative [ ] headache [ ] dizziness [ ] syncope [ ] weakness [ ] numbness  Psychiatric: [ ] negative [ ] anxiety [ ] depression  Endocrine: [ ] negative [ ] diabetes [ ] thyroid problem  Hematologic/Lymphatic: [ ] negative [ ] anemia [ ] bleeding problem  Allergic/Immunologic: [ ] negative [ ] itchy eyes [ ] nasal discharge [ ] hives [ ] angioedema  [X] All other systems negative  [ ] Unable to assess ROS because ________    OBJECTIVE:  ICU Vital Signs Last 24 Hrs  T(C): 37.1 (17 Aug 2023 07:39), Max: 38.3 (17 Aug 2023 06:04)  T(F): 98.8 (17 Aug 2023 07:39), Max: 100.9 (17 Aug 2023 06:04)  HR: 70 (17 Aug 2023 06:04) (39 - 76)  BP: 134/70 (17 Aug 2023 06:04) (132/74 - 157/80)  BP(mean): --  ABP: --  ABP(mean): --  RR: 18 (17 Aug 2023 06:04) (16 - 18)  SpO2: 96% (17 Aug 2023 06:04) (96% - 100%)    O2 Parameters below as of 17 Aug 2023 06:04  Patient On (Oxygen Delivery Method): room air              08-16 @ 07:01  -  08-17 @ 07:00  --------------------------------------------------------  IN: 825 mL / OUT: 0 mL / NET: 825 mL      CAPILLARY BLOOD GLUCOSE          PHYSICAL EXAM:  General: NAD, resting comfortably  HEENT: EOMI, PERRLA  Neck: Supple  Respiratory: CTAB  Cardiovascular: S1S2  Abdomen: NTNS, BS+  Extremities: No edema  Skin: Jaundiced  Neurological: No focal   Psychiatry:    LINES:     HOSPITAL MEDICATIONS:  Standing Meds:  atorvastatin 80 milliGRAM(s) Oral at bedtime  enoxaparin Injectable 40 milliGRAM(s) SubCutaneous every 24 hours  lactated ringers. 1000 milliLiter(s) IV Continuous <Continuous>  magnesium sulfate  IVPB 1 Gram(s) IV Intermittent once  piperacillin/tazobactam IVPB.. 3.375 Gram(s) IV Intermittent every 8 hours  sodium phosphate 15 milliMole(s)/250 mL IVPB 15 milliMole(s) IV Intermittent once      PRN Meds:  acetaminophen   IVPB .. 1000 milliGRAM(s) IV Intermittent every 6 hours PRN      LABS:                        13.4   14.93 )-----------( 277      ( 17 Aug 2023 04:57 )             39.6     Hgb Trend: 13.4<--, 13.3<--  08-17    133<L>  |  101  |  8   ----------------------------<  145<H>  4.2   |  20<L>  |  0.44<L>    Ca    8.9      17 Aug 2023 04:57  Phos  2.8     08-17  Mg     1.90     08-17    TPro  7.6  /  Alb  3.7  /  TBili  4.6<H>  /  DBili  3.6<H>  /  AST  113<H>  /  ALT  169<H>  /  AlkPhos  551<H>  08-17    Creatinine Trend: 0.44<--, 0.57<--  PT/INR - ( 17 Aug 2023 04:57 )   PT: 11.9 sec;   INR: 1.05 ratio         PTT - ( 17 Aug 2023 04:57 )  PTT:33.2 sec  Urinalysis Basic - ( 17 Aug 2023 04:57 )    Color: x / Appearance: x / SG: x / pH: x  Gluc: 145 mg/dL / Ketone: x  / Bili: x / Urobili: x   Blood: x / Protein: x / Nitrite: x   Leuk Esterase: x / RBC: x / WBC x   Sq Epi: x / Non Sq Epi: x / Bacteria: x        Venous Blood Gas:  08-16 @ 21:07  7.33/56/25/30/36.3  VBG Lactate: 1.6      MICROBIOLOGY:       RADIOLOGY:  [ ] Reviewed and interpreted by me    PULMONARY FUNCTION TESTS:    EKG: CHIEF COMPLAINT: epigastric pain    HPI:  Patient is a 72 yo F w/ pulmonary fibrosis, HLD, ovarian cystectomy who was admitted 8/17 with 3 days of epigastric pain. Patient found to have choledocholithiasis after CT showing cholelithiasis w/ 1cm stone in CBD with upstream ductal dilitation and no evidence of cholesystitis. Pulmonary consulted for preoperative evaluation after CT chest showed bilateral peripheral reticular pattern, GGO and traction bronchiectasis, most notable in RML and LLL.     As per patient and  at bedside, patient was incidentally found to have pulmonary fibrosis in 2019 after routine CXR in [place]. She was referred to Dr. Cunha (Pulm) and had PFTs done and was told her disease was mild with plan to monitor off medication. However, she was lost to follow up since 2020 due to the pandemic. She endorses noticing no change in her breathing although her ET has been limited by back and joint pains. Endorses can walk 2 blocks before stopping due to pain. Endorses intermittent dry cough. Endorses ankle, knee and back pains. Denies any rash. Mother also had lung disease but unknown which.     Patient currently breathing comfortably and saturating well on room air. Labs notable for leukocytosis to 14.93, Bili 4.6 w/ direct 3.6, ALk phos 551, , , VBG 7.33/56. CT showing cholelithiasis w/ 1cm stone in CBD with upstream ductal dilitation and no evidence of cholesystitis. Additionally it showed ?diverticulitis. CT chest showed reticular pattern w/ traction bronchiectasis and no honey combing concerning for pulmonary fibrosis.     PAST MEDICAL & SURGICAL HISTORY:  Pulmonary fibrosis  Hyperlipidemia  Hypertension  S/P ovarian cystectomy    FAMILY HISTORY:      SOCIAL HISTORY:  Smoking: [X] Never Smoked [ ] Former Smoker (__ packs x ___ years) [ ] Current Smoker  (__ packs x ___ years)  Substance Use: [X] Never Used [ ] Used ____  EtOH Use:  Marital Status: [ ] Single [X]  [ ]  [ ]   Sexual History:   Occupation: Retired teacher  Recent Travel:  Country of Birth: [place]  Advance Directives:    Allergies    No Known Allergies    Intolerances        HOME MEDICATIONS:  Home Medications:  aspirin 81 mg oral tablet: 1 orally once a day (17 Aug 2023 03:08)  rosuvastatin 20 mg oral tablet: 1 orally once a day (17 Aug 2023 03:07)      REVIEW OF SYSTEMS:  Constitutional: [ ] negative [ ] fevers [ ] chills [ ] weight loss [ ] weight gain  HEENT: [ ] negative [ ] dry eyes [ ] eye irritation [ ] postnasal drip [ ] nasal congestion  CV: [ ] negative  [ ] chest pain [ ] orthopnea [ ] palpitations [ ] murmur  Resp: [ ] negative [X] cough [ ] shortness of breath [ ] dyspnea [ ] wheezing [ ] sputum [ ] hemoptysis  GI: [ ] negative [ ] nausea [ ] vomiting [ ] diarrhea [ ] constipation [X] abd pain [ ] dysphagia   : [ ] negative [ ] dysuria [ ] nocturia [ ] hematuria [ ] increased urinary frequency  Musculoskeletal: [ ] negative [X] back pain [ ] myalgias [X] arthralgias [ ] fracture  Skin: [ ] negative [ ] rash [ ] itch  Neurological: [ ] negative [ ] headache [ ] dizziness [ ] syncope [ ] weakness [ ] numbness  Psychiatric: [ ] negative [ ] anxiety [ ] depression  Endocrine: [ ] negative [ ] diabetes [ ] thyroid problem  Hematologic/Lymphatic: [ ] negative [ ] anemia [ ] bleeding problem  Allergic/Immunologic: [ ] negative [ ] itchy eyes [ ] nasal discharge [ ] hives [ ] angioedema  [X] All other systems negative  [ ] Unable to assess ROS because ________    OBJECTIVE:  ICU Vital Signs Last 24 Hrs  T(C): 37.1 (17 Aug 2023 07:39), Max: 38.3 (17 Aug 2023 06:04)  T(F): 98.8 (17 Aug 2023 07:39), Max: 100.9 (17 Aug 2023 06:04)  HR: 70 (17 Aug 2023 06:04) (39 - 76)  BP: 134/70 (17 Aug 2023 06:04) (132/74 - 157/80)  BP(mean): --  ABP: --  ABP(mean): --  RR: 18 (17 Aug 2023 06:04) (16 - 18)  SpO2: 96% (17 Aug 2023 06:04) (96% - 100%)    O2 Parameters below as of 17 Aug 2023 06:04  Patient On (Oxygen Delivery Method): room air              08-16 @ 07:01  -  08-17 @ 07:00  --------------------------------------------------------  IN: 825 mL / OUT: 0 mL / NET: 825 mL      CAPILLARY BLOOD GLUCOSE          PHYSICAL EXAM:  General: NAD, resting comfortably  HEENT: EOMI, PERRLA  Neck: Supple  Respiratory: CTAB  Cardiovascular: S1S2  Abdomen: NTNS, BS+  Extremities: No edema  Skin: Jaundiced  Neurological: No focal defecits    LINES:     HOSPITAL MEDICATIONS:  Standing Meds:  atorvastatin 80 milliGRAM(s) Oral at bedtime  enoxaparin Injectable 40 milliGRAM(s) SubCutaneous every 24 hours  lactated ringers. 1000 milliLiter(s) IV Continuous <Continuous>  magnesium sulfate  IVPB 1 Gram(s) IV Intermittent once  piperacillin/tazobactam IVPB.. 3.375 Gram(s) IV Intermittent every 8 hours  sodium phosphate 15 milliMole(s)/250 mL IVPB 15 milliMole(s) IV Intermittent once      PRN Meds:  acetaminophen   IVPB .. 1000 milliGRAM(s) IV Intermittent every 6 hours PRN      LABS:                        13.4   14.93 )-----------( 277      ( 17 Aug 2023 04:57 )             39.6     Hgb Trend: 13.4<--, 13.3<--  08-17    133<L>  |  101  |  8   ----------------------------<  145<H>  4.2   |  20<L>  |  0.44<L>    Ca    8.9      17 Aug 2023 04:57  Phos  2.8     08-17  Mg     1.90     08-17    TPro  7.6  /  Alb  3.7  /  TBili  4.6<H>  /  DBili  3.6<H>  /  AST  113<H>  /  ALT  169<H>  /  AlkPhos  551<H>  08-17    Creatinine Trend: 0.44<--, 0.57<--  PT/INR - ( 17 Aug 2023 04:57 )   PT: 11.9 sec;   INR: 1.05 ratio         PTT - ( 17 Aug 2023 04:57 )  PTT:33.2 sec  Urinalysis Basic - ( 17 Aug 2023 04:57 )    Color: x / Appearance: x / SG: x / pH: x  Gluc: 145 mg/dL / Ketone: x  / Bili: x / Urobili: x   Blood: x / Protein: x / Nitrite: x   Leuk Esterase: x / RBC: x / WBC x   Sq Epi: x / Non Sq Epi: x / Bacteria: x        Venous Blood Gas:  08-16 @ 21:07  7.33/56/25/30/36.3  VBG Lactate: 1.6      MICROBIOLOGY:       RADIOLOGY:  [ ] Reviewed and interpreted by me    PULMONARY FUNCTION TESTS:    EKG:

## 2023-08-17 NOTE — DISCHARGE NOTE PROVIDER - CARE PROVIDER_API CALL
Blanco Singh  Gastroenterology  [address] [place] [address], [address]  [place] [address]  Phone: [phone]  Fax: [phone]  Follow Up Time: 2 months    Samson Bridges  Surgery  [address] [place] [address] Level [place] [address]  Phone: [phone]  Fax: [phone]  Follow Up Time: 1 week

## 2023-08-17 NOTE — PRE PROCEDURE NOTE - PRE PROCEDURE EVALUATION
Attending Physician:            HÉCTOR                Procedure: ERCP    Indication for Procedure: CBD stone, GS pancreatitis  ________________________________________________________  PAST MEDICAL & SURGICAL HISTORY:  Pulmonary fibrosis      Hyperlipidemia      Hypertension      S/P ovarian cystectomy        ALLERGIES:  shellfish (Hives; Rash)  Drug Allergies Not Recorded    HOME MEDICATIONS:  aspirin 81 mg oral tablet: 1 orally once a day  rosuvastatin 20 mg oral tablet: 1 orally once a day    AICD/PPM: [ ] yes   [ x] no    PERTINENT LAB DATA:                        13.4   14.93 )-----------( 277      ( 17 Aug 2023 04:57 )             39.6     08-17    133<L>  |  101  |  8   ----------------------------<  145<H>  4.2   |  20<L>  |  0.44<L>    Ca    8.9      17 Aug 2023 04:57  Phos  2.8     08-17  Mg     1.90     08-17    TPro  7.6  /  Alb  3.7  /  TBili  4.6<H>  /  DBili  3.6<H>  /  AST  113<H>  /  ALT  169<H>  /  AlkPhos  551<H>  08-17    PT/INR - ( 17 Aug 2023 04:57 )   PT: 11.9 sec;   INR: 1.05 ratio         PTT - ( 17 Aug 2023 04:57 )  PTT:33.2 sec            PHYSICAL EXAMINATION:    Height (cm): 160  Weight (kg): 79.379  BMI (kg/m2): 31  BSA (m2): 1.83T(C): 36.3  HR: 61  BP: 118/67  RR: 20  SpO2: 96%    Constitutional: NAD  HEENT: PERRLA, EOMI,    Neck:  No JVD  Respiratory: CTAB/L  Cardiovascular: S1 and S2  Gastrointestinal: BS+, soft, NT/ND  Extremities: No peripheral edema  Neurological: A/O x 3, no focal deficits  Psychiatric: Normal mood, normal affect  Skin: No rashes    ASA Class: I [ ]  II [ ]  III [x ]  IV [ ]    COMMENTS:    The patient is a suitable candidate for the planned procedure unless box checked [ ]  No, explain:    I explained the risks (bleeding, infection perforation, pancreatitis, severe pancreatitis, Cv risk, anesthesia risk)/b/a with the patient. The patient and  (Jean) agrees to proceed. They ahd the chance to ask questions in preprocedure bay 11.

## 2023-08-17 NOTE — CONSULT NOTE ADULT - ASSESSMENT
74YO Female PMH ILD (no o2, not on antifibrotics), HLD, ovarian cystectomy who was admitted 8/17 with 3 days of epigastric pain found to have choledocolithiasis. CT showing cholelithiasis w/ 1cm stone in CBD with upstream ductal dilitation and no evidence of cholesystitis. Additionally it showed ?diverticulitis. CT chest showed reticular pattern w/ traction bronchiectasis and no honey combing concerning for pulmonary fibrosis. Pulmonary consulted for optimization in the setting of fibrosing ILD.    #Pulmonary Fibrosis  - Pt planned for ERCP/lap raza on this admission pending GI and surgery eval  - Pt is presently on room air  - VBG without acidemia  - CT chest showed reticular pattern w/ traction bronchiectasis and no honey combing concerning for pulmonary fibrosis Patient is a 72 yo F w/ pulmonary fibrosis, HLD, ovarian cystectomy who was admitted  with 3 days of epigastric pain. Patient found to have choledocholithiasis after CT showing cholelithiasis w/ 1cm stone in CBD with upstream ductal dilitation and no evidence of cholesystitis. Pulmonary consulted for preoperative evaluation after CT chest showed bilateral peripheral reticular pattern, GGO and traction bronchiectasis, most notable in RML and LLL.     As per patient and  at bedside, patient was incidentally found to have pulmonary fibrosis in 2019 after routine CXR in [place]. She was referred to Dr. Cunha (Pulm) and had PFTs done and was told her disease was mild with plan to monitor off medication. However, she was lost to follow up since  due to the pandemic. She endorses noticing no change in her breathing although her ET has been limited by back and joint pains. Endorses can walk 2 blocks before stopping due to pain. Endorses intermittent dry cough. Endorses ankle, knee and back pains. Denies any rash. Mother also had lung disease but unknown which.     Patient currently breathing comfortably and saturating well on room air. Labs notable for leukocytosis to 14.93, Bili 4.6 w/ direct 3.6, ALk phos 551, , , VBG 7.33/56. CT showing cholelithiasis w/ 1cm stone in CBD with upstream ductal dilitation and no evidence of cholesystitis. Additionally it showed ?diverticulitis. CT chest showed reticular pattern w/ traction bronchiectasis and no honey combing concerning for pulmonary fibrosis.     #Pulmonary Fibrosis  #Pre operative evaluation  #Choledocholithiasis   - Currently patient is on RA with no respiratory complaints. Patient is optimized from pulmonary standpoint for possible cholecystectomy  - If not already sent, please send RF, anti-ccp, anti-centromere, CK, aldolase, myositis panel, MyoMarker Panel 3 (includes anti-MDA5 antibodies; needs a paper requisition), ANCA, SCL-70 ab, BROOKLYN, DsDNA, anti-RO, anti-LA, IgG4, RNP (order as "extractable nuclear antigens"), Aislinn-1 antibodies, and HIV antibodies.  - Patient would benefit from outpatient pulmonary follows up with PFTs and sleep study  - Needs outpatient pulmonary follow up upon discharge at [address] [place] [address], [address] ([phone]).  Please e-mail [email or web address] to make an appointment for the patient.  Please include the name, , and a phone number for you and the patient)    Levon Casper MD  Pulmonary & Critical Care   Patient is a 74 yo F w/ pulmonary fibrosis, HLD, ovarian cystectomy who was admitted  with 3 days of epigastric pain. Patient found to have choledocholithiasis after CT showing cholelithiasis w/ 1cm stone in CBD with upstream ductal dilitation and no evidence of cholesystitis. Pulmonary consulted for preoperative evaluation after CT chest showed bilateral peripheral reticular pattern, GGO and traction bronchiectasis, most notable in RML and LLL.     As per patient and  at bedside, patient was incidentally found to have pulmonary fibrosis in 2019 after routine CXR in [place]. She was referred to Dr. Cunha (Pulm) and had PFTs done and was told her disease was mild with plan to monitor off medication. However, she was lost to follow up since  due to the pandemic. She endorses noticing no change in her breathing although her ET has been limited by back and joint pains. Endorses can walk 2 blocks before stopping due to pain. Endorses intermittent dry cough. Endorses ankle, knee and back pains. Denies any rash. Mother also had lung disease but unknown which.     Patient currently breathing comfortably and saturating well on room air. Labs notable for leukocytosis to 14.93, Bili 4.6 w/ direct 3.6, ALk phos 551, , , VBG 7.33/56. CT showing cholelithiasis w/ 1cm stone in CBD with upstream ductal dilitation and no evidence of cholesystitis. Additionally it showed ?diverticulitis. CT chest showed reticular pattern w/ traction bronchiectasis and no honey combing concerning for pulmonary fibrosis.     Patient is now s/p ERCP with stent placement, stone removal.    #Pulmonary Fibrosis  #Pre operative evaluation  #Choledocholithiasis   - Currently patient is on RA with no respiratory complaints. Patient is optimized from pulmonary standpoint for possible cholecystectomy  - If not already sent, please send RF, anti-ccp, anti-centromere, CK, aldolase, myositis panel, MyoMarker Panel 3 (includes anti-MDA5 antibodies; needs a paper requisition), ANCA, SCL-70 ab, BROOKLYN, DsDNA, anti-RO, anti-LA, IgG4, RNP (order as "extractable nuclear antigens"), Aislinn-1 antibodies, and HIV antibodies.  - Patient would benefit from outpatient pulmonary follows up with PFTs and sleep study  - Needs outpatient pulmonary follow up upon discharge at [address] [place] [address] [address] ([phone]).  Please e-mail [email or web address] to make an appointment for the patient.  Please include the name, , and a phone number for you and the patient)    Levon Casper MD  Pulmonary & Critical Care

## 2023-08-17 NOTE — DISCHARGE NOTE PROVIDER - NSDCCPCAREPLAN_GEN_ALL_CORE_FT
PRINCIPAL DISCHARGE DIAGNOSIS  Diagnosis: CL (cholelithiasis)  Assessment and Plan of Treatment:       SECONDARY DISCHARGE DIAGNOSES  Diagnosis: Hyperbilirubinemia  Assessment and Plan of Treatment:

## 2023-08-17 NOTE — ASU PREOP CHECKLIST - ASSESSMENT, HISTORY & PHYSICAL COMPLETED AND ON MEDICAL RECORD
[Dear  ___] : Dear  [unfilled], [Consult Letter:] : I had the pleasure of evaluating your patient, [unfilled]. [Please see my note below.] : Please see my note below. [Consult Closing:] : Thank you very much for allowing me to participate in the care of this patient.  If you have any questions, please do not hesitate to contact me. [Sincerely,] : Sincerely, [FreeTextEntry3] : Junior Tineo, DO\par Genitourinary Medicine\par  done

## 2023-08-17 NOTE — DISCHARGE NOTE PROVIDER - CARE PROVIDERS DIRECT ADDRESSES
[email or web address]

## 2023-08-17 NOTE — CONSULT NOTE ADULT - ASSESSMENT
72yo F with h/o pulmonary fibrosis (not on meds), HLD, ovarian cystectomy presenting with 3 days of epigastric pain. Endorses pain in the RUQ that began 3 days ago, associated with nausea found with 1 cm CBD stone. GI called for further recs.     #Choledocholithiasis   #Cholelithiasis    #Focal colonic thickening, concerning for diverticulitis   -Patient with previous episode, no mroe diarrhea. Benign abd exam'  -Last colonoscopy 2 years ago, can have repeat Colonoscopy as OP with her GI    Recs:  -NPO  -Pulmonary recs appreciated  -Agree with IV abx given CT findings  -Plan for ERCP today  -Rest per surgical Team    Recommendations preliminary until signed by attending.     Hitesh Ramos MD  Gastroenterology/Hepatology Fellow  1st option: [phone] (text or call), ONLY available from 7:00 am to 5:00 pm.   **Contact on-call GI fellow via answering service ([phone]) from 5pm-7am AND on weekends/holidays**  2nd option: Available via [employer] Teams  3rd option: Pager: [phone]

## 2023-08-17 NOTE — CONSULT NOTE ADULT - CONSULT REQUESTED BY NAME
Kelly is a 68 year old who is being evaluated via a billable telephone visit.      What phone number would you like to be contacted at? [phone]  How would you like to obtain your AVS? Angi Beasley MA    Phone call duration: 11 minutes  
Surgery
Surgery

## 2023-08-17 NOTE — CHART NOTE - NSCHARTNOTEFT_GEN_A_CORE
POST-OPERATIVE CHECK    SUBJECTIVE  Patient is s/p ERCP. Pt with some lightheadedness and fatigue i/s/o ambulation (post sedation). Pt without abdominal pain. Denies nausea, vomiting, chest pain.    Vital Signs Last 24 Hrs  T(C): 36.6 (17 Aug 2023 13:54), Max: 38.3 (17 Aug 2023 06:04)  T(F): 97.8 (17 Aug 2023 13:54), Max: 100.9 (17 Aug 2023 06:04)  HR: 57 (17 Aug 2023 13:54) (39 - 76)  BP: 154/83 (17 Aug 2023 13:54) (110/63 - 157/80)  BP(mean): --  RR: 18 (17 Aug 2023 13:54) (16 - 20)  SpO2: 97% (17 Aug 2023 13:54) (95% - 100%)    Parameters below as of 17 Aug 2023 13:54  Patient On (Oxygen Delivery Method): room air      I&O's Detail    16 Aug 2023 07:01  -  17 Aug 2023 07:00  --------------------------------------------------------  IN:    IV PiggyBack: 200 mL    Lactated Ringers: 625 mL  Total IN: 825 mL    OUT:    Oral Fluid: 0 mL    Voided (mL): 0 mL  Total OUT: 0 mL    Total NET: 825 mL      17 Aug 2023 07:01  -  17 Aug 2023 14:40  --------------------------------------------------------  IN:    Lactated Ringers: 500 mL  Total IN: 500 mL    OUT:    Oral Fluid: 0 mL  Total OUT: 0 mL    Total NET: 500 mL        piperacillin/tazobactam IVPB.. 3.375  enoxaparin Injectable 40  piperacillin/tazobactam IVPB.. 3.375    PAST MEDICAL & SURGICAL HISTORY:  Pulmonary fibrosis      Hyperlipidemia      Hypertension      S/P ovarian cystectomy          PHYSICAL EXAM  General: NAD, resting comfortably in bed  Pulmonary: Nonlabored breathing, no respiratory distress  Cardiovascular: NSR  Abdominal: soft, NT/ND  Extremities: WWP    LABS                        13.4   14.93 )-----------( 277      ( 17 Aug 2023 04:57 )             39.6     08-17    133<L>  |  101  |  8   ----------------------------<  145<H>  4.2   |  20<L>  |  0.44<L>    Ca    8.9      17 Aug 2023 04:57  Phos  2.8     08-17  Mg     1.90     08-17    TPro  7.6  /  Alb  3.7  /  TBili  4.6<H>  /  DBili  3.6<H>  /  AST  113<H>  /  ALT  169<H>  /  AlkPhos  551<H>  08-17    PT/INR - ( 17 Aug 2023 04:57 )   PT: 11.9 sec;   INR: 1.05 ratio         PTT - ( 17 Aug 2023 04:57 )  PTT:33.2 sec  CAPILLARY BLOOD GLUCOSE          IMAGING:  ERCP 08/17/23    Impression:          EGD:                       -The distal esophagus contained a 1 cm tongue of salmon                        colored mucosa. This was biopsied.                       -The stomach was normal.                       -The duodenal bulb and D2 were normal.                         ERCP:                       -The duodenoscope was passedto the ampulla. There was a                        altagracia-ampullary diverticulum.                       -The bile duct was cannulated using a preloaded                        sphincterotome. The PD was neither cannulated or                        injected.                       -Contrast was injected into the bile duct. I acquired                        and interpreted the images.                       -There was a dilated CBD with filling defects. The                        cystic duct and Gb filled. The GB was dilated with large                        CBD stones.                       -A biliary sphincterotomy was performed.                       -Two stones were removed along with pus.                       -Given the pus, [id number] cm straight stent was placed                        under endoscopic and fluoroscopic guidance.                       -There was excellent flow of bile.  Recommendation:      - Return patient to hospital [facility] for ongoing care.                       - ERCP in 2-3 months for stent removal.                       - NPO                       - IVF (3L) to prevent post-ERCP pancreatitis.                       - Cholecystectomy per surgical team.    ASSESSMENT  73yFemale PMHx mild pulmonary fibrosis, HLD, ovarian cystectomy p/w choledocholithiasis and cholangitis now s/p ERCP showing two stones (removed) and expression of pus from dilated CBD. Recovering appropriately on the floor.    PLAN  - Diet: NPO per GI recs  - 125cc/hr IVF (24 hrs 3L) per GI recs   - Pain control as needed  - DVT ppx: lovenox  - OOB and ambulating as tolerated  - Will discuss timing of cholecystectomy  - F/u AM labs    Surgery B Team  [id number]

## 2023-08-17 NOTE — H&P ADULT - HISTORY OF PRESENT ILLNESS
74yo F with h/o pulmonary fibrosis (not on meds), HLD, ovarian cystectomy presenting with 3 days of epigastric pain. Endorses pain in the RUQ that began 3 days ago, associated with nausea and diarrhea but no fevers or chills. Has had a few similar episodes in the past.

## 2023-08-17 NOTE — DISCHARGE NOTE PROVIDER - NSDCFUADDINST_GEN_ALL_CORE_FT
WOUND CARE:  Please keep incisions clean and dry. Please do not Scrub or rub incisions. Do not use lotion or powder on incisions.   BATHING: You may shower and/or sponge bathe. You may use warm soapy water in the shower and rinse, pat dry.  ACTIVITY: No heavy lifting or straining. Otherwise, you may return to your usual level of physical activity. If you are taking narcotic pain medication DO NOT drive a car, operate machinery or make important decisions.  DIET: Return to your usual diet.  NOTIFY YOUR SURGEON IF YOU HAVE: any bleeding that does not stop, any pus draining from your wound(s), any fever (over 100.4 F) persistent nausea/vomiting, or if your pain is not controlled on your discharge pain medications, unable to urinate.  Please follow up with your primary care physician in one week regarding your hospitalization, bring copies of your discharge paperwork.  Please follow up with your surgeon, Dr. Bridges. Call [phone] to make an appointment.

## 2023-08-17 NOTE — CONSULT NOTE ADULT - SUBJECTIVE AND OBJECTIVE BOX
HPI:  74yo F with h/o pulmonary fibrosis (not on meds), HLD, ovarian cystectomy presenting with 3 days of epigastric pain. Endorses pain in the RUQ that began 3 days ago, associated with nausea found with 1 cm CBD stone. GI called for further recs.     Allergies:  No Known Allergies        Hospital Medications:  acetaminophen   IVPB .. 1000 milliGRAM(s) IV Intermittent every 6 hours PRN  atorvastatin 80 milliGRAM(s) Oral at bedtime  enoxaparin Injectable 40 milliGRAM(s) SubCutaneous every 24 hours  lactated ringers. 1000 milliLiter(s) IV Continuous <Continuous>  magnesium sulfate  IVPB 1 Gram(s) IV Intermittent once  piperacillin/tazobtam IVPB.. 3.375 Gram(s) IV Intermittent every 8 hours  sodium phosphate 15 milliMole(s)/250 mL IVPB 15 milliMole(s) IV Intermittent once      PMHX/PSHX:  Pulmonary fibrosis    Hyperlipidemia    Hypertension    S/P ovarian cystectomy        Family history:      Social History: no smoking    ROS:   General:  No fevers, chills or night sweats  ENT:  No sore throat or dysphagia  CV:  No pain or palpitations  Resp:  No dyspnea, cough or  wheezing  GI:  as above  Skin:  No rash or edema  Neuro: no weakness   Hematologic: no bleeding  Musculoskeletal: no muscle pain or join pain  Psych: no agitation     : no dysuria      PHYSICAL EXAM:   GENERAL:  NAD, Appears stated age  HEENT:  NC/AT,  conjunctivae clear and pink, sclera -anicteric  CHEST:  CTA B/L, Normal effort  HEART:  RRR S1/S2,  ABDOMEN:  Soft, mild tenderness  EXTREMITIES:  No cyanosis or Edema  SKIN:  Warm & Dry. No rash or erythema  NEURO:  Alert, oriented, no focal deficit    Vital Signs:  Vital Signs Last 24 Hrs  T(C): 37.1 (17 Aug 2023 07:39), Max: 38.3 (17 Aug 2023 06:04)  T(F): 98.8 (17 Aug 2023 07:39), Max: 100.9 (17 Aug 2023 06:04)  HR: 70 (17 Aug 2023 06:04) (39 - 76)  BP: 134/70 (17 Aug 2023 06:04) (132/74 - 157/80)  BP(mean): --  RR: 18 (17 Aug 2023 06:04) (16 - 18)  SpO2: 96% (17 Aug 2023 06:04) (96% - 100%)    Parameters below as of 17 Aug 2023 06:04  Patient On (Oxygen Delivery Method): room air      Daily Height in cm: 160.02 (17 Aug 2023 03:14)    Daily     LABS:                        13.4   14.93 )-----------( 277      ( 17 Aug 2023 04:57 )             39.6     Mean Cell Volume: 89.0 fL (08-17-23 @ 04:57)    08-17    133<L>  |  101  |  8   ----------------------------<  145<H>  4.2   |  20<L>  |  0.44<L>    Ca    8.9      17 Aug 2023 04:57  Phos  2.8     08-17  Mg     1.90     08-17    TPro  7.6  /  Alb  3.7  /  TBili  4.6<H>  /  DBili  3.6<H>  /  AST  113<H>  /  ALT  169<H>  /  AlkPhos  551<H>  08-17    LIVER FUNCTIONS - ( 17 Aug 2023 04:57 )  Alb: 3.7 g/dL / Pro: 7.6 g/dL / ALK PHOS: 551 U/L / ALT: 169 U/L / AST: 113 U/L / GGT: x           PT/INR - ( 17 Aug 2023 04:57 )   PT: 11.9 sec;   INR: 1.05 ratio         PTT - ( 17 Aug 2023 04:57 )  PTT:33.2 sec  Urinalysis Basic - ( 17 Aug 2023 04:57 )    Color: x / Appearance: x / SG: x / pH: x  Gluc: 145 mg/dL / Ketone: x  / Bili: x / Urobili: x   Blood: x / Protein: x / Nitrite: x   Leuk Esterase: x / RBC: x / WBC x   Sq Epi: x / Non Sq Epi: x / Bacteria: x      Amylase Serum--      Lipase serum29       Ammonia--                          13.4   14.93 )-----------( 277      ( 17 Aug 2023 04:57 )             39.6                         13.3   11.04 )-----------( 284      ( 16 Aug 2023 21:07 )             40.1     Imaging:  < from: CT Angio Abdomen and Pelvis w/ IV Cont (08.16.23 @ 21:49) >  IMPRESSION:  No aortic dissection or aortic intramural hematoma.    Cholelithiasis, including a 1 cm stone in the common bile duct with   upstream extrahepatic bile duct dilatation. No CT evidence for acute   cholecystitis.    Focal colonic wall thickening centered about a diverticulum at the   splenic flexure with subtle adjacent fat stranding, may represent   diverticulitis, correlate with patient's symptoms.    Chronic interstitial lung disease.    < end of copied text >

## 2023-08-17 NOTE — DISCHARGE NOTE PROVIDER - NSDCMRMEDTOKEN_GEN_ALL_CORE_FT
aspirin 81 mg oral tablet: 1 orally once a day  rosuvastatin 20 mg oral tablet: 1 orally once a day   amoxicillin-clavulanate 875 mg-125 mg oral tablet: 1 tab(s) orally 2 times a day  aspirin 81 mg oral tablet: 1 orally once a day  rosuvastatin 20 mg oral tablet: 1 orally once a day

## 2023-08-17 NOTE — H&P ADULT - ASSESSMENT
72yo F with h/o pulmonary fibrosis (not on meds), HLD, ovarian cystectomy presenting with choledocholithiasis without signs of cholangitis.    Plan;  - Admit ot B team surgery under Dr. Bridges, floor bed  - Abx  - NPO/IVF  - GI consulted (emailed) for ERCP given choledocho with visible stone on CT  - Will monitor for development of cholangitis  - Pain control  - Hold home prophylactic ASA81  - Plan for cholecystectomy this admission    LISA Levin, PGY-4  B Team Surgery   [id number]

## 2023-08-17 NOTE — DISCHARGE NOTE PROVIDER - HOSPITAL COURSE
Patient is a 73 year-old female with past medical history of hyperlipidemia, ovarian cystectomy and pulmonary fibrosis (not currently on medications) who presented with choledocholithiasis without signs of cholangitis. She was admitted to the surgical floor under Dr. Bridges. She was started on Zosyn and given IV fluids while she was made NPO for procedure. She was taken to the endoscopy suite with the gastroenterology team for endoscopic retrograde cholangiopancreatography for _______________________. She was then taken to the operating room for laparoscopic cholecystectomy with Dr _______.      She is now recovering well on the floor. She is tolerating her regular diet, her pain is well controlled and she is medically stable for discharge with follow up in 2 weeks with  _______ Patient is a 73 year-old female with past medical history of hyperlipidemia, ovarian cystectomy and pulmonary fibrosis (not currently on medications) who presented with choledocholithiasis without signs of cholangitis. She was admitted to the surgical floor under Dr. Bridges. She was started on Zosyn and given IV fluids while she was made NPO for procedure. She was taken to the endoscopy suite with the gastroenterology team for endoscopic retrograde cholangiopancreatography revealing two stones (removed) and expression of pus from dilated CBD. She was then taken to the operating room for laparoscopic cholecystectomy with Dr _______.      She is now recovering well on the floor. She is tolerating her regular diet, her pain is well controlled and she is medically stable for discharge with follow up in 2 weeks with . _______ Patient is a 73 year-old female with past medical history of hyperlipidemia, ovarian cystectomy and pulmonary fibrosis (not currently on medications) who presented with choledocholithiasis without signs of cholangitis. She was admitted to the surgical floor under Dr. Bridges. She was started on Zosyn and given IV fluids while she was made NPO for procedure. She was taken to the endoscopy suite with the gastroenterology team for endoscopic retrograde cholangiopancreatography revealing two stones (removed) and expression of pus from dilated CBD. She was then taken to the operating room for laparoscopic cholecystectomy with Dr Bridges    She is now recovering well on the floor. She is tolerating her regular diet, her pain is well controlled and she is medically stable for discharge with follow up in 2 weeks with Dr. Bridges

## 2023-08-17 NOTE — PATIENT PROFILE ADULT - FALL HARM RISK - HARM RISK INTERVENTIONS

## 2023-08-17 NOTE — DISCHARGE NOTE PROVIDER - PROVIDER TOKENS
PROVIDER:[TOKEN:[[id number]],FOLLOWUP:[2 months]],PROVIDER:[TOKEN:[[id number]],FOLLOWUP:[1 week]]

## 2023-08-17 NOTE — H&P ADULT - NSHPLABSRESULTS_GEN_ALL_CORE
LABS:                          13.3   11.04 )-----------( 284      ( 16 Aug 2023 21:07 )             40.1       08-16    139  |  102  |  13  ----------------------------<  170<H>  4.0   |  26  |  0.57    Ca    9.4      16 Aug 2023 21:07    TPro  8.2  /  Alb  4.1  /  TBili  3.0<H>  /  DBili  x   /  AST  107<H>  /  ALT  154<H>  /  AlkPhos  544<H>  08-16              Urinalysis Basic - ( 16 Aug 2023 21:07 )    Color: x / Appearance: x / SG: x / pH: x  Gluc: 170 mg/dL / Ketone: x  / Bili: x / Urobili: x   Blood: x / Protein: x / Nitrite: x   Leuk Esterase: x / RBC: x / WBC x   Sq Epi: x / Non Sq Epi: x / Bacteria: x      _______________________________________  RADIOLOGY & ADDITIONAL STUDIES:  < from: CT Angio Abdomen and Pelvis w/ IV Cont (08.16.23 @ 21:49) >    IMPRESSION:  No aortic dissection or aortic intramural hematoma.    Cholelithiasis, including a 1 cm stone in the common bile duct with   upstream extrahepatic bile duct dilatation. No CT evidence for acute   cholecystitis.    Focal colonic wall thickening centered about a diverticulum at the   splenic flexure with subtle adjacent fat stranding, may represent   diverticulitis, correlate with patient's symptoms.    Chronic interstitial lung disease.    < end of copied text >

## 2023-08-17 NOTE — ED ADULT NURSE REASSESSMENT NOTE - NS ED NURSE REASSESS COMMENT FT1
Break Coverage RN: Pt A&Ox4, respirations equal and unlabored. Pt resting in stretcher at this time, offers no complaints. IV patent. Vitals performed and documented. Pending surgery to see patient. No acute distress noted. Safety maintained, bed in lowest position, side rails raised, call bell in reach.

## 2023-08-17 NOTE — DISCHARGE NOTE PROVIDER - NSDCCPTREATMENT_GEN_ALL_CORE_FT
PRINCIPAL PROCEDURE  Procedure: Laparoscopic cholecystectomy  Findings and Treatment:       SECONDARY PROCEDURE  Procedure: EGD, with ERCP  Findings and Treatment:

## 2023-08-18 ENCOUNTER — TRANSCRIPTION ENCOUNTER (OUTPATIENT)
Age: 73
End: 2023-08-18

## 2023-08-18 ENCOUNTER — RESULT REVIEW (OUTPATIENT)
Age: 73
End: 2023-08-18

## 2023-08-18 LAB
ALBUMIN SERPL ELPH-MCNC: 3.4 G/DL — SIGNIFICANT CHANGE UP (ref 3.3–5)
ALP SERPL-CCNC: 484 U/L — HIGH (ref 40–120)
ALT FLD-CCNC: 155 U/L — HIGH (ref 4–33)
ANION GAP SERPL CALC-SCNC: 11 MMOL/L — SIGNIFICANT CHANGE UP (ref 7–14)
APTT BLD: 31.5 SEC — SIGNIFICANT CHANGE UP (ref 24.5–35.6)
AST SERPL-CCNC: 92 U/L — HIGH (ref 4–32)
BILIRUB DIRECT SERPL-MCNC: 4.7 MG/DL — HIGH (ref 0–0.3)
BILIRUB INDIRECT FLD-MCNC: 0.4 MG/DL — SIGNIFICANT CHANGE UP (ref 0–1)
BILIRUB SERPL-MCNC: 5.1 MG/DL — HIGH (ref 0.2–1.2)
BLD GP AB SCN SERPL QL: NEGATIVE — SIGNIFICANT CHANGE UP
BUN SERPL-MCNC: 7 MG/DL — SIGNIFICANT CHANGE UP (ref 7–23)
CALCIUM SERPL-MCNC: 9 MG/DL — SIGNIFICANT CHANGE UP (ref 8.4–10.5)
CHLORIDE SERPL-SCNC: 102 MMOL/L — SIGNIFICANT CHANGE UP (ref 98–107)
CO2 SERPL-SCNC: 23 MMOL/L — SIGNIFICANT CHANGE UP (ref 22–31)
CREAT SERPL-MCNC: 0.46 MG/DL — LOW (ref 0.5–1.3)
EGFR: 100 ML/MIN/1.73M2 — SIGNIFICANT CHANGE UP
GLUCOSE BLDC GLUCOMTR-MCNC: 101 MG/DL — HIGH (ref 70–99)
GLUCOSE SERPL-MCNC: 115 MG/DL — HIGH (ref 70–99)
HCT VFR BLD CALC: 35.2 % — SIGNIFICANT CHANGE UP (ref 34.5–45)
HGB BLD-MCNC: 11.9 G/DL — SIGNIFICANT CHANGE UP (ref 11.5–15.5)
INR BLD: 1.07 RATIO — SIGNIFICANT CHANGE UP (ref 0.85–1.18)
MAGNESIUM SERPL-MCNC: 2.1 MG/DL — SIGNIFICANT CHANGE UP (ref 1.6–2.6)
MCHC RBC-ENTMCNC: 30.1 PG — SIGNIFICANT CHANGE UP (ref 27–34)
MCHC RBC-ENTMCNC: 33.8 GM/DL — SIGNIFICANT CHANGE UP (ref 32–36)
MCV RBC AUTO: 88.9 FL — SIGNIFICANT CHANGE UP (ref 80–100)
NRBC # BLD: 0 /100 WBCS — SIGNIFICANT CHANGE UP (ref 0–0)
NRBC # FLD: 0 K/UL — SIGNIFICANT CHANGE UP (ref 0–0)
PHOSPHATE SERPL-MCNC: 3.4 MG/DL — SIGNIFICANT CHANGE UP (ref 2.5–4.5)
PLATELET # BLD AUTO: 234 K/UL — SIGNIFICANT CHANGE UP (ref 150–400)
POTASSIUM SERPL-MCNC: 3.7 MMOL/L — SIGNIFICANT CHANGE UP (ref 3.5–5.3)
POTASSIUM SERPL-SCNC: 3.7 MMOL/L — SIGNIFICANT CHANGE UP (ref 3.5–5.3)
PROT SERPL-MCNC: 6.8 G/DL — SIGNIFICANT CHANGE UP (ref 6–8.3)
PROTHROM AB SERPL-ACNC: 12 SEC — SIGNIFICANT CHANGE UP (ref 9.5–13)
RBC # BLD: 3.96 M/UL — SIGNIFICANT CHANGE UP (ref 3.8–5.2)
RBC # FLD: 14.8 % — HIGH (ref 10.3–14.5)
RH IG SCN BLD-IMP: POSITIVE — SIGNIFICANT CHANGE UP
SODIUM SERPL-SCNC: 136 MMOL/L — SIGNIFICANT CHANGE UP (ref 135–145)
WBC # BLD: 6.58 K/UL — SIGNIFICANT CHANGE UP (ref 3.8–10.5)
WBC # FLD AUTO: 6.58 K/UL — SIGNIFICANT CHANGE UP (ref 3.8–10.5)

## 2023-08-18 PROCEDURE — 99232 SBSQ HOSP IP/OBS MODERATE 35: CPT | Mod: GC

## 2023-08-18 PROCEDURE — 88304 TISSUE EXAM BY PATHOLOGIST: CPT | Mod: 26

## 2023-08-18 PROCEDURE — 47562 LAPAROSCOPIC CHOLECYSTECTOMY: CPT | Mod: GC

## 2023-08-18 DEVICE — CLIP APPLIER COVIDIEN ENDOCLIP III 5MM: Type: IMPLANTABLE DEVICE | Status: FUNCTIONAL

## 2023-08-18 RX ORDER — OXYCODONE HYDROCHLORIDE 5 MG/1
2.5 TABLET ORAL EVERY 4 HOURS
Refills: 0 | Status: DISCONTINUED | OUTPATIENT
Start: 2023-08-18 | End: 2023-08-19

## 2023-08-18 RX ORDER — OXYCODONE HYDROCHLORIDE 5 MG/1
5 TABLET ORAL EVERY 4 HOURS
Refills: 0 | Status: DISCONTINUED | OUTPATIENT
Start: 2023-08-18 | End: 2023-08-19

## 2023-08-18 RX ORDER — FENTANYL CITRATE 50 UG/ML
50 INJECTION INTRAVENOUS
Refills: 0 | Status: DISCONTINUED | OUTPATIENT
Start: 2023-08-18 | End: 2023-08-18

## 2023-08-18 RX ORDER — POTASSIUM CHLORIDE 20 MEQ
10 PACKET (EA) ORAL
Refills: 0 | Status: COMPLETED | OUTPATIENT
Start: 2023-08-18 | End: 2023-08-18

## 2023-08-18 RX ORDER — ACETAMINOPHEN 500 MG
975 TABLET ORAL EVERY 6 HOURS
Refills: 0 | Status: DISCONTINUED | OUTPATIENT
Start: 2023-08-18 | End: 2023-08-19

## 2023-08-18 RX ORDER — FENTANYL CITRATE 50 UG/ML
25 INJECTION INTRAVENOUS
Refills: 0 | Status: DISCONTINUED | OUTPATIENT
Start: 2023-08-18 | End: 2023-08-18

## 2023-08-18 RX ORDER — SODIUM CHLORIDE 9 MG/ML
1000 INJECTION, SOLUTION INTRAVENOUS
Refills: 0 | Status: DISCONTINUED | OUTPATIENT
Start: 2023-08-18 | End: 2023-08-18

## 2023-08-18 RX ADMIN — SODIUM CHLORIDE 125 MILLILITER(S): 9 INJECTION, SOLUTION INTRAVENOUS at 02:16

## 2023-08-18 RX ADMIN — Medication 100 MILLIEQUIVALENT(S): at 03:24

## 2023-08-18 RX ADMIN — ENOXAPARIN SODIUM 40 MILLIGRAM(S): 100 INJECTION SUBCUTANEOUS at 18:22

## 2023-08-18 RX ADMIN — PIPERACILLIN AND TAZOBACTAM 25 GRAM(S): 4; .5 INJECTION, POWDER, LYOPHILIZED, FOR SOLUTION INTRAVENOUS at 05:43

## 2023-08-18 RX ADMIN — Medication 100 MILLIEQUIVALENT(S): at 02:16

## 2023-08-18 RX ADMIN — Medication 100 MILLIEQUIVALENT(S): at 04:43

## 2023-08-18 RX ADMIN — PIPERACILLIN AND TAZOBACTAM 25 GRAM(S): 4; .5 INJECTION, POWDER, LYOPHILIZED, FOR SOLUTION INTRAVENOUS at 18:18

## 2023-08-18 NOTE — DIETITIAN INITIAL EVALUATION ADULT - PERTINENT LABORATORY DATA
08-18    136  |  102  |  7   ----------------------------<  115<H>  3.7   |  23  |  0.46<L>    Ca    9.0      18 Aug 2023 00:30  Phos  3.4     08-18  Mg     2.10     08-18    TPro  6.8  /  Alb  3.4  /  TBili  5.1<H>  /  DBili  4.7<H>  /  AST  92<H>  /  ALT  155<H>  /  AlkPhos  484<H>  08-18  POCT Blood Glucose.: 101 mg/dL (08-18-23 @ 09:49)

## 2023-08-18 NOTE — PROVIDER CONTACT NOTE (OTHER) - BACKGROUND
dx calculus pf gallbladder without cholecystitis without obstruction
calculus of gallbladder w/o raza w/o obstruction. s/p ercp 8/17

## 2023-08-18 NOTE — DIETITIAN INITIAL EVALUATION ADULT - OTHER INFO
73yFemale PMHx mild pulmonary fibrosis, HLD, ovarian cystectomy p/w choledocholithiasis and cholangitis now s/p ERCP showing two stones (removed) and expression of pus from dilated CBD. Recovering appropriately. This morning patient is doing well, AVSS, AM labs 8/18 show total bili 5.1 , passing gas, pain controlled.   Patient is NPO, planned for OR today 8/18 for lap cholecystectomy. Consult received for "MST Score >2," Pt reports stable wts prior to admission. Did experience 3 days of epigastric pain, with nausea and vomiting which led to poor PO. Pt with non pitting edema to lower extremities.

## 2023-08-18 NOTE — DIETITIAN INITIAL EVALUATION ADULT - PERTINENT MEDS FT
MEDICATIONS  (STANDING):  atorvastatin 80 milliGRAM(s) Oral at bedtime  enoxaparin Injectable 40 milliGRAM(s) SubCutaneous every 24 hours  lactated ringers. 1000 milliLiter(s) (125 mL/Hr) IV Continuous <Continuous>  piperacillin/tazobactam IVPB.. 3.375 Gram(s) IV Intermittent every 8 hours    MEDICATIONS  (PRN):  acetaminophen   IVPB .. 1000 milliGRAM(s) IV Intermittent every 6 hours PRN Temp greater or equal to 38C (100.4F), Mild Pain (1 - 3)

## 2023-08-18 NOTE — DIETITIAN INITIAL EVALUATION ADULT - HEIGHT FOR BMI (INCHES)
3
pneumonia  s/p fevers  leukocytosis - normalized    plan - cont zosyn 3.375gms iv q8 hrs   will plan to dc Friday on augmentin 875mgs po bid x 3 days more

## 2023-08-18 NOTE — PROGRESS NOTE ADULT - SUBJECTIVE AND OBJECTIVE BOX
TEAM [ B ] Surgery Daily Progress Note  =====================================================    SUBJECTIVE: Patient seen and examined at bedside on AM rounds. Patient reports that they're feeling well, pain is controlled. Patient is NPO,  passing gas, no bowel movements yet. Denies fever, chills, N/V, chest pain, SOB    ALLERGIES:  shellfish (Hives; Rash)  No Known Drug Allergies      --------------------------------------------------------------------------------------    MEDICATIONS:    Neurologic Medications  acetaminophen   IVPB .. 1000 milliGRAM(s) IV Intermittent every 6 hours PRN Temp greater or equal to 38C (100.4F), Mild Pain (1 - 3)    Respiratory Medications    Cardiovascular Medications    Gastrointestinal Medications  lactated ringers. 1000 milliLiter(s) IV Continuous <Continuous>    Genitourinary Medications    Hematologic/Oncologic Medications  enoxaparin Injectable 40 milliGRAM(s) SubCutaneous every 24 hours    Antimicrobial/Immunologic Medications  piperacillin/tazobactam IVPB.. 3.375 Gram(s) IV Intermittent every 8 hours    Endocrine/Metabolic Medications  atorvastatin 80 milliGRAM(s) Oral at bedtime    Topical/Other Medications    --------------------------------------------------------------------------------------    VITAL SIGNS:  T(C): 37.1 (08-18-23 @ 09:55), Max: 37.1 (08-17-23 @ 18:00)  HR: 59 (08-18-23 @ 09:55) (56 - 70)  BP: 142/71 (08-18-23 @ 09:55) (110/63 - 157/76)  RR: 18 (08-18-23 @ 09:55) (16 - 20)  SpO2: 98% (08-18-23 @ 09:55) (95% - 98%)  --------------------------------------------------------------------------------------    INS AND OUTS:    08-17-23 @ 07:01  -  08-18-23 @ 07:00  --------------------------------------------------------  IN: 2400 mL / OUT: 0 mL / NET: 2400 mL      --------------------------------------------------------------------------------------      EXAM    General: NAD, resting in bed comfortably.  Cardiac: regular rate, warm and well perfused  Respiratory: Nonlabored respirations, normal cw expansion.  Abdomen: soft, nontender, nondistended.   Extremities: normal strength, FROM, no deformities    --------------------------------------------------------------------------------------    LABS                          11.9   6.58  )-----------( 234      ( 18 Aug 2023 00:30 )             35.2     08-18    136  |  102  |  7   ----------------------------<  115<H>  3.7   |  23  |  0.46<L>    Ca    9.0      18 Aug 2023 00:30  Phos  3.4     08-18  Mg     2.10     08-18    TPro  6.8  /  Alb  3.4  /  TBili  5.1<H>  /  DBili  4.7<H>  /  AST  92<H>  /  ALT  155<H>  /  AlkPhos  484<H>  08-18    PT/INR - ( 18 Aug 2023 00:30 )   PT: 12.0 sec;   INR: 1.07 ratio         PTT - ( 18 Aug 2023 00:30 )  PTT:31.5 sec  Urinalysis Basic - ( 18 Aug 2023 00:30 )    Color: x / Appearance: x / SG: x / pH: x  Gluc: 115 mg/dL / Ketone: x  / Bili: x / Urobili: x   Blood: x / Protein: x / Nitrite: x   Leuk Esterase: x / RBC: x / WBC x   Sq Epi: x / Non Sq Epi: x / Bacteria: x      shellfish (Hives; Rash)  No Known Drug Allergies    T(C): 37.1 (08-18-23 @ 09:55), Max: 37.1 (08-17-23 @ 18:00)  HR: 59 (08-18-23 @ 09:55) (56 - 70)  BP: 142/71 (08-18-23 @ 09:55) (110/63 - 157/76)  RR: 18 (08-18-23 @ 09:55) (16 - 20)  SpO2: 98% (08-18-23 @ 09:55) (95% - 98%)    08-17-23 @ 07:01  -  08-18-23 @ 07:00  --------------------------------------------------------  IN: 2400 mL / OUT: 0 mL / NET: 2400 mL

## 2023-08-18 NOTE — PROGRESS NOTE ADULT - ASSESSMENT
73yFemale PMHx mild pulmonary fibrosis, HLD, ovarian cystectomy p/w choledocholithiasis and cholangitis now s/p ERCP showing two stones (removed) and expression of pus from dilated CBD. Recovering appropriately. This morning patient is doing well, AVSS, stable labs, passing gas, pain controlled. Patient was cleared by pulmonology yesterday 8/17 and is planned for OR today 8/18 for lap cholecystectomy.  Patient is pre-oped, NPO, and consented.     PLAN  - Current DVT ppx: lovenox  - Diet: NPO   - Pain control as needed  - OR today 8/18 for planned cholecystectomy.   - POC in PACU after 4hrs after procedure.  - F/u AM labs    Surgery B Team  [id number].   73yFemale PMHx mild pulmonary fibrosis, HLD, ovarian cystectomy p/w choledocholithiasis and cholangitis now s/p ERCP showing two stones (removed) and expression of pus from dilated CBD. Recovering appropriately. This morning patient is doing well, AVSS, AM labs 8/18 show total bili 5.1 , passing gas, pain controlled. Patient was cleared by pulmonology yesterday 8/17 and is planned for OR today 8/18 for lap cholecystectomy.  Patient is pre-oped, NPO, and consented.     PLAN  - Current DVT ppx: lovenox  - Diet: NPO   - Pain control as needed  - OR today 8/18 for planned cholecystectomy.   - POC in PACU after 4hrs after procedure.  - F/u AM labs    Surgery B Team  [id number].

## 2023-08-18 NOTE — PROGRESS NOTE ADULT - ASSESSMENT
72yo F with h/o pulmonary fibrosis (not on meds), HLD, ovarian cystectomy presenting with 3 days of epigastric pain. Endorses pain in the RUQ that began 3 days ago, associated with nausea found with 1 cm CBD stone. GI called for further recs.     #Choledocholithiasis   #Cholelithiasis  -s/p ERCP with biliary sphincterotomy performed and Two stones were removed along with pus. Given the pus, [id number] cm straight stent was placed under endoscopic and fluoroscopic guidance.                    #Focal colonic thickening, concerning for diverticulitis   -Patient with previous episode, no mroe diarrhea. Benign abd exam'  -Last colonoscopy 2 years ago, can have repeat Colonoscopy as OP with her GI    Recs:  -surgery recs appreciated  -Will need repeat ERCP in 3 months for stent removal   -Continue abx   -Rest per primary team    Recommendations preliminary until signed by attending.     Hitesh Ramos MD  Gastroenterology/Hepatology Fellow  1st option: [phone] (text or call), ONLY available from 7:00 am to 5:00 pm.   **Contact on-call GI fellow via answering service ([phone]) from 5pm-7am AND on weekends/holidays**  2nd option: Available via [employer] Teams  3rd option: Pager: [phone]

## 2023-08-18 NOTE — CHART NOTE - NSCHARTNOTEFT_GEN_A_CORE
POST-OPERATIVE CHECK    SUBJECTIVE  Patient is s/p laparoscopic cholecystectomy. Pt w/ some RUQ pain radiating to back. Having some episodes of bradycardia.   Denies nausea, vomiting, chest pain.    Vital Signs Last 24 Hrs  T(C): 36.7 (18 Aug 2023 15:00), Max: 37.1 (17 Aug 2023 18:00)  T(F): 98.1 (18 Aug 2023 15:00), Max: 98.8 (17 Aug 2023 18:00)  HR: 55 (18 Aug 2023 15:00) (54 - 71)  BP: 142/71 (18 Aug 2023 15:00) (115/52 - 155/73)  BP(mean): 92 (18 Aug 2023 15:00) (70 - 94)  RR: 17 (18 Aug 2023 15:00) (14 - 22)  SpO2: 96% (18 Aug 2023 15:00) (94% - 100%)    Parameters below as of 18 Aug 2023 14:00  Patient On (Oxygen Delivery Method): room air      I&O's Detail    17 Aug 2023 07:01  -  18 Aug 2023 07:00  --------------------------------------------------------  IN:    IV PiggyBack: 400 mL    Lactated Ringers: 2000 mL  Total IN: 2400 mL    OUT:    Oral Fluid: 0 mL    Voided (mL): 0 mL  Total OUT: 0 mL    Total NET: 2400 mL      18 Aug 2023 07:01  -  18 Aug 2023 16:13  --------------------------------------------------------  IN:    Lactated Ringers: 75 mL    Oral Fluid: 100 mL  Total IN: 175 mL    OUT:  Total OUT: 0 mL    Total NET: 175 mL        piperacillin/tazobactam IVPB.. 3.375  enoxaparin Injectable 40  piperacillin/tazobactam IVPB.. 3.375    PAST MEDICAL & SURGICAL HISTORY:  Pulmonary fibrosis      Hyperlipidemia      Hypertension      S/P ovarian cystectomy          PHYSICAL EXAM  General: NAD, resting comfortably in bed  Pulmonary: Nonlabored breathing, no respiratory distress  Cardiovascular: NSR  Abdominal: soft, ND, some tenderness expected postop. Laparoscopic port sites c/d/i.   Extremities: WWP    LABS                        11.9   6.58  )-----------( 234      ( 18 Aug 2023 00:30 )             35.2     08-18    136  |  102  |  7   ----------------------------<  115<H>  3.7   |  23  |  0.46<L>    Ca    9.0      18 Aug 2023 00:30  Phos  3.4     08-18  Mg     2.10     08-18    TPro  6.8  /  Alb  3.4  /  TBili  5.1<H>  /  DBili  4.7<H>  /  AST  92<H>  /  ALT  155<H>  /  AlkPhos  484<H>  08-18    PT/INR - ( 18 Aug 2023 00:30 )   PT: 12.0 sec;   INR: 1.07 ratio         PTT - ( 18 Aug 2023 00:30 )  PTT:31.5 sec  CAPILLARY BLOOD GLUCOSE      POCT Blood Glucose.: 101 mg/dL (18 Aug 2023 09:49)    ASSESSMENT  73yFemale PMHx mild pulmonary fibrosis, HLD, ovarian cystectomy and choledocho s/p ercp 08/17  Now, s/p lap cholecystectomy 08-18. Recovering appropriately in the PACU    PLAN  - Diet - regular. IVL 08/19 if tolerating dinner.   - Pain control as needed  - DVT ppx: lovenox  - OOB and ambulating as tolerated  - F/u AM labs  - Continue to hold home clopidogrel until 08/21    Surgery B Team  [id number] POST-OPERATIVE CHECK    SUBJECTIVE  Patient is s/p laparoscopic cholecystectomy. Recovering well. Denies abd pain.   Denies nausea, vomiting, chest pain.    Vital Signs Last 24 Hrs  T(C): 36.7 (18 Aug 2023 15:00), Max: 37.1 (17 Aug 2023 18:00)  T(F): 98.1 (18 Aug 2023 15:00), Max: 98.8 (17 Aug 2023 18:00)  HR: 55 (18 Aug 2023 15:00) (54 - 71)  BP: 142/71 (18 Aug 2023 15:00) (115/52 - 155/73)  BP(mean): 92 (18 Aug 2023 15:00) (70 - 94)  RR: 17 (18 Aug 2023 15:00) (14 - 22)  SpO2: 96% (18 Aug 2023 15:00) (94% - 100%)    Parameters below as of 18 Aug 2023 14:00  Patient On (Oxygen Delivery Method): room air      I&O's Detail    17 Aug 2023 07:01  -  18 Aug 2023 07:00  --------------------------------------------------------  IN:    IV PiggyBack: 400 mL    Lactated Ringers: 2000 mL  Total IN: 2400 mL    OUT:    Oral Fluid: 0 mL    Voided (mL): 0 mL  Total OUT: 0 mL    Total NET: 2400 mL      18 Aug 2023 07:01  -  18 Aug 2023 16:13  --------------------------------------------------------  IN:    Lactated Ringers: 75 mL    Oral Fluid: 100 mL  Total IN: 175 mL    OUT:  Total OUT: 0 mL    Total NET: 175 mL        piperacillin/tazobactam IVPB.. 3.375  enoxaparin Injectable 40  piperacillin/tazobactam IVPB.. 3.375    PAST MEDICAL & SURGICAL HISTORY:  Pulmonary fibrosis      Hyperlipidemia      Hypertension      S/P ovarian cystectomy          PHYSICAL EXAM  General: NAD, resting comfortably in bed  Pulmonary: Nonlabored breathing, no respiratory distress  Cardiovascular: NSR  Abdominal: soft, NDNT. Laparoscopic port sites c/d/i.   Extremities: WWP    LABS                        11.9   6.58  )-----------( 234      ( 18 Aug 2023 00:30 )             35.2     08-18    136  |  102  |  7   ----------------------------<  115<H>  3.7   |  23  |  0.46<L>    Ca    9.0      18 Aug 2023 00:30  Phos  3.4     08-18  Mg     2.10     08-18    TPro  6.8  /  Alb  3.4  /  TBili  5.1<H>  /  DBili  4.7<H>  /  AST  92<H>  /  ALT  155<H>  /  AlkPhos  484<H>  08-18    PT/INR - ( 18 Aug 2023 00:30 )   PT: 12.0 sec;   INR: 1.07 ratio         PTT - ( 18 Aug 2023 00:30 )  PTT:31.5 sec  CAPILLARY BLOOD GLUCOSE      POCT Blood Glucose.: 101 mg/dL (18 Aug 2023 09:49)    ASSESSMENT  73y Female PMHx mild pulmonary fibrosis, HLD, ovarian cystectomy and choledocho s/p ercp 08/17  Now, s/p lap cholecystectomy 08-18. Recovering appropriately in the PACU    PLAN  - Diet - regular. IVL 08/19 if tolerating dinner.   - Pain control as needed  - DVT ppx: lovenox  - OOB and ambulating as tolerated  - F/u AM labs  - Dispo anticipated: 08/19    Surgery B Team  [id number]

## 2023-08-18 NOTE — BRIEF OPERATIVE NOTE - OPERATION/FINDINGS
laparoscopic cholecystectomy, inflamed gallbladder, duct and artery clipped, hemostasis at end of case laparoscopic cholecystectomy, inflamed gallbladder, artery clipped, dilated duct taken with endoloop, hemostasis at end of case

## 2023-08-18 NOTE — DIETITIAN INITIAL EVALUATION ADULT - NS FNS DIET ORDER
Diet, NPO after Midnight:      NPO Start Date: 17-Aug-2023,   NPO Start Time: 23:59  Except Medications (08-17-23 @ 16:02)  Diet, NPO:   Except Medications  With Ice Chips/Sips of Water (08-17-23 @ 15:06)

## 2023-08-18 NOTE — PROGRESS NOTE ADULT - SUBJECTIVE AND OBJECTIVE BOX
Interval Events:   no acute events  feeling okay  no abd pain    Hospital Medications:  acetaminophen   IVPB .. 1000 milliGRAM(s) IV Intermittent every 6 hours PRN  atorvastatin 80 milliGRAM(s) Oral at bedtime  enoxaparin Injectable 40 milliGRAM(s) SubCutaneous every 24 hours  lactated ringers. 1000 milliLiter(s) IV Continuous <Continuous>  piperacillin/tazobactam IVPB.. 3.375 Gram(s) IV Intermittent every 8 hours      ROS: All system reviewed and negative except as mentioned above.    PHYSICAL EXAM:   Vital Signs:  Vital Signs Last 24 Hrs  T(C): 36.7 (18 Aug 2023 06:00), Max: 37.1 (17 Aug 2023 07:39)  T(F): 98 (18 Aug 2023 06:00), Max: 98.8 (17 Aug 2023 07:39)  HR: 60 (18 Aug 2023 06:00) (56 - 70)  BP: 117/60 (18 Aug 2023 06:00) (110/63 - 157/76)  BP(mean): --  RR: 17 (18 Aug 2023 06:00) (16 - 20)  SpO2: 96% (18 Aug 2023 06:00) (95% - 98%)    Parameters below as of 18 Aug 2023 06:00  Patient On (Oxygen Delivery Method): room air      Daily Height in cm: 160.02 (17 Aug 2023 10:17)    Daily     GENERAL:  NAD, Appears stated age  HEENT:  NC/AT,  conjunctivae clear and pink, sclera -anicteric  CHEST:  Normal Effort, Breath sounds clear  HEART:  RRR, S1 + S2, no murmurs  ABDOMEN:  Soft, non-tender, non-distended, normoactive bowel sounds,  no masses  EXTREMITIES:  no cyanosis or edema  SKIN:  Warm & Dry. No rash or erythema  NEURO:  Alert, oriented, no focal deficit    LABS:                        11.9   6.58  )-----------( 234      ( 18 Aug 2023 00:30 )             35.2     Mean Cell Volume: 88.9 fL (08-18-23 @ 00:30)    08-18    136  |  102  |  7   ----------------------------<  115<H>  3.7   |  23  |  0.46<L>    Ca    9.0      18 Aug 2023 00:30  Phos  3.4     08-18  Mg     2.10     08-18    TPro  6.8  /  Alb  3.4  /  TBili  5.1<H>  /  DBili  4.7<H>  /  AST  92<H>  /  ALT  155<H>  /  AlkPhos  484<H>  08-18    LIVER FUNCTIONS - ( 18 Aug 2023 00:30 )  Alb: 3.4 g/dL / Pro: 6.8 g/dL / ALK PHOS: 484 U/L / ALT: 155 U/L / AST: 92 U/L / GGT: x           PT/INR - ( 18 Aug 2023 00:30 )   PT: 12.0 sec;   INR: 1.07 ratio         PTT - ( 18 Aug 2023 00:30 )  PTT:31.5 sec  Urinalysis Basic - ( 18 Aug 2023 00:30 )    Color: x / Appearance: x / SG: x / pH: x  Gluc: 115 mg/dL / Ketone: x  / Bili: x / Urobili: x   Blood: x / Protein: x / Nitrite: x   Leuk Esterase: x / RBC: x / WBC x   Sq Epi: x / Non Sq Epi: x / Bacteria: x                              11.9   6.58  )-----------( 234      ( 18 Aug 2023 00:30 )             35.2                         13.4   14.93 )-----------( 277      ( 17 Aug 2023 04:57 )             39.6                         13.3   11.04 )-----------( 284      ( 16 Aug 2023 21:07 )             40.1       Imaging: Images reviewed.

## 2023-08-18 NOTE — PROVIDER CONTACT NOTE (OTHER) - ACTION/TREATMENT ORDERED:
B team ALKA gee aware came to bedside to assess.
provider aware. IV Tylenol ordered to be administered

## 2023-08-19 ENCOUNTER — TRANSCRIPTION ENCOUNTER (OUTPATIENT)
Age: 73
End: 2023-08-19

## 2023-08-19 VITALS
RESPIRATION RATE: 18 BRPM | SYSTOLIC BLOOD PRESSURE: 145 MMHG | TEMPERATURE: 99 F | OXYGEN SATURATION: 95 % | DIASTOLIC BLOOD PRESSURE: 77 MMHG | HEART RATE: 60 BPM

## 2023-08-19 LAB
ALBUMIN SERPL ELPH-MCNC: 3.7 G/DL — SIGNIFICANT CHANGE UP (ref 3.3–5)
ALP SERPL-CCNC: 454 U/L — HIGH (ref 40–120)
ALT FLD-CCNC: 172 U/L — HIGH (ref 4–33)
ANION GAP SERPL CALC-SCNC: 9 MMOL/L — SIGNIFICANT CHANGE UP (ref 7–14)
AST SERPL-CCNC: 97 U/L — HIGH (ref 4–32)
BILIRUB DIRECT SERPL-MCNC: 1.4 MG/DL — HIGH (ref 0–0.3)
BILIRUB INDIRECT FLD-MCNC: 0.8 MG/DL — SIGNIFICANT CHANGE UP (ref 0–1)
BILIRUB SERPL-MCNC: 2.2 MG/DL — HIGH (ref 0.2–1.2)
BUN SERPL-MCNC: 12 MG/DL — SIGNIFICANT CHANGE UP (ref 7–23)
CALCIUM SERPL-MCNC: 9.2 MG/DL — SIGNIFICANT CHANGE UP (ref 8.4–10.5)
CHLORIDE SERPL-SCNC: 101 MMOL/L — SIGNIFICANT CHANGE UP (ref 98–107)
CK SERPL-CCNC: 232 U/L — HIGH (ref 25–170)
CO2 SERPL-SCNC: 27 MMOL/L — SIGNIFICANT CHANGE UP (ref 22–31)
CREAT SERPL-MCNC: 0.61 MG/DL — SIGNIFICANT CHANGE UP (ref 0.5–1.3)
EGFR: 94 ML/MIN/1.73M2 — SIGNIFICANT CHANGE UP
GLUCOSE SERPL-MCNC: 109 MG/DL — HIGH (ref 70–99)
HCT VFR BLD CALC: 35.7 % — SIGNIFICANT CHANGE UP (ref 34.5–45)
HGB BLD-MCNC: 12 G/DL — SIGNIFICANT CHANGE UP (ref 11.5–15.5)
HIV 1+2 AB+HIV1 P24 AG SERPL QL IA: SIGNIFICANT CHANGE UP
MAGNESIUM SERPL-MCNC: 2 MG/DL — SIGNIFICANT CHANGE UP (ref 1.6–2.6)
MCHC RBC-ENTMCNC: 30.2 PG — SIGNIFICANT CHANGE UP (ref 27–34)
MCHC RBC-ENTMCNC: 33.6 GM/DL — SIGNIFICANT CHANGE UP (ref 32–36)
MCV RBC AUTO: 89.9 FL — SIGNIFICANT CHANGE UP (ref 80–100)
NRBC # BLD: 0 /100 WBCS — SIGNIFICANT CHANGE UP (ref 0–0)
NRBC # FLD: 0 K/UL — SIGNIFICANT CHANGE UP (ref 0–0)
PHOSPHATE SERPL-MCNC: 3 MG/DL — SIGNIFICANT CHANGE UP (ref 2.5–4.5)
PLATELET # BLD AUTO: 249 K/UL — SIGNIFICANT CHANGE UP (ref 150–400)
POTASSIUM SERPL-MCNC: 4.2 MMOL/L — SIGNIFICANT CHANGE UP (ref 3.5–5.3)
POTASSIUM SERPL-SCNC: 4.2 MMOL/L — SIGNIFICANT CHANGE UP (ref 3.5–5.3)
PROT SERPL-MCNC: 7.2 G/DL — SIGNIFICANT CHANGE UP (ref 6–8.3)
RBC # BLD: 3.97 M/UL — SIGNIFICANT CHANGE UP (ref 3.8–5.2)
RBC # FLD: 15.4 % — HIGH (ref 10.3–14.5)
RHEUMATOID FACT SERPL-ACNC: 13 IU/ML — SIGNIFICANT CHANGE UP (ref 0–13)
SODIUM SERPL-SCNC: 137 MMOL/L — SIGNIFICANT CHANGE UP (ref 135–145)
WBC # BLD: 10.26 K/UL — SIGNIFICANT CHANGE UP (ref 3.8–10.5)
WBC # FLD AUTO: 10.26 K/UL — SIGNIFICANT CHANGE UP (ref 3.8–10.5)

## 2023-08-19 RX ADMIN — PIPERACILLIN AND TAZOBACTAM 25 GRAM(S): 4; .5 INJECTION, POWDER, LYOPHILIZED, FOR SOLUTION INTRAVENOUS at 02:59

## 2023-08-19 NOTE — PROGRESS NOTE ADULT - ATTENDING COMMENTS
I have personally interviewed and examined this patient, reviewed pertinent labs and imaging, and discussed the case with Dr. Bridges, residents on B Team rounds.    no complaints    Vital Signs Last 24 Hrs  T(C): 37.1 (19 Aug 2023 10:28), Max: 37.4 (18 Aug 2023 22:00)  T(F): 98.8 (19 Aug 2023 10:28), Max: 99.3 (18 Aug 2023 22:00)  HR: 60 (19 Aug 2023 10:28) (54 - 71)  BP: 145/77 (19 Aug 2023 10:28) (115/52 - 154/83)  BP(mean): 92 (18 Aug 2023 15:00) (70 - 94)  RR: 18 (19 Aug 2023 10:28) (14 - 22)  SpO2: 95% (19 Aug 2023 10:28) (94% - 100%)    Parameters below as of 19 Aug 2023 10:28  Patient On (Oxygen Delivery Method): room air      abd soft, NT/ND, incisions c/d/i                          12.0   10.26 )-----------( 249      ( 19 Aug 2023 02:50 )             35.7   08-19    137  |  101  |  12  ----------------------------<  109<H>  4.2   |  27  |  0.61    Ca    9.2      19 Aug 2023 02:50  Phos  3.0     08-19  Mg     2.00     08-19    TPro  7.2  /  Alb  3.7  /  TBili  2.2<H>  /  DBili  1.4<H>  /  AST  97<H>  /  ALT  172<H>  /  AlkPhos  454<H>  08-19      The active care issues are:  1. s/p ERCP and stent for cholangitis  2. s/p lap CCY    Recovering as expected.  ADAT.  Complete antibiotic course.  D/c home today with outpatient f/u (GI and gen surg)    The Acute Care Surgery (B Team) Practice:    urgent issues - spectra [id number]  nonurgent issues - [phone]  patient appointments or afterhours - [phone]
Agree with above  ERCP s/p stone removal and stent.

## 2023-08-19 NOTE — DISCHARGE NOTE NURSING/CASE MANAGEMENT/SOCIAL WORK - PATIENT PORTAL LINK FT
You can access the FollowMyHealth Patient Portal offered by [facility] by registering at the following website: [email or web address]. By joining [employer]’s FollowMyHealth portal, you will also be able to view your health information using other applications (apps) compatible with our system.

## 2023-08-19 NOTE — DISCHARGE NOTE NURSING/CASE MANAGEMENT/SOCIAL WORK - NSDCFUADDAPPT_GEN_ALL_CORE_FT
Please follow up with your primary care provider 1-2 weeks after discharge regarding recent hospitalization.     Please follow up with Pulmonology 1-2 weeks after discharge regarding management of pulmonary fibrosis.     Please follow up with Dr Singh, gastroenterology, 2-3 months after discharge for stent removal.

## 2023-08-19 NOTE — PROGRESS NOTE ADULT - SUBJECTIVE AND OBJECTIVE BOX
ANESTHESIA POSTOPERATIVE NOTE    Patient is a 73y old  Female who presents with a chief complaint of Choledocholithiasis (19 Aug 2023 10:43) s/p lap raza        Vital Signs Last 24 Hrs  T(C): 37.1 (19 Aug 2023 10:28), Max: 37.4 (18 Aug 2023 22:00)  T(F): 98.8 (19 Aug 2023 10:28), Max: 99.3 (18 Aug 2023 22:00)  HR: 60 (19 Aug 2023 10:28) (60 - 67)  BP: 145/77 (19 Aug 2023 10:28) (135/70 - 154/83)  BP(mean): --  RR: 18 (19 Aug 2023 10:28) (18 - 19)  SpO2: 95% (19 Aug 2023 10:28) (95% - 99%)    Parameters below as of 19 Aug 2023 10:28  Patient On (Oxygen Delivery Method): room air          [x ] No apparent anesthesia related complications     Comments:  ANESTHESIA POSTOPERATIVE NOTE    Patient is a 73y old  Female who presents with a chief complaint of Choledocholithiasis (19 Aug 2023 10:43) s/p lap raza        Vital Signs Last 24 Hrs  T(C): 37.1 (19 Aug 2023 10:28), Max: 37.4 (18 Aug 2023 22:00)  T(F): 98.8 (19 Aug 2023 10:28), Max: 99.3 (18 Aug 2023 22:00)  HR: 60 (19 Aug 2023 10:28) (60 - 67)  BP: 145/77 (19 Aug 2023 10:28) (135/70 - 154/83)  BP(mean): --  RR: 18 (19 Aug 2023 10:28) (18 - 19)  SpO2: 95% (19 Aug 2023 10:28) (95% - 99%)    Parameters below as of 19 Aug 2023 10:28  Patient On (Oxygen Delivery Method): room air          [x ] No apparent anesthesia related complications     Comments: pt. d/c to home

## 2023-08-19 NOTE — DISCHARGE NOTE NURSING/CASE MANAGEMENT/SOCIAL WORK - NSDCPEFALRISK_GEN_ALL_CORE
For information on Fall & Injury Prevention, visit: [email or web address] OR  [email or web address] OR  https://www.cdc.gov/steadi/patient.html

## 2023-08-21 LAB
ALDOLASE SERPL-CCNC: 12.2 U/L — HIGH (ref 3.3–10.3)
ANTI-RIBONUCLEAR PROTEIN: 0.2 AI — SIGNIFICANT CHANGE UP
CCP IGG SERPL-ACNC: <8 UNITS — SIGNIFICANT CHANGE UP
CENTROMERE AB SER-ACNC: <0.2 AI — SIGNIFICANT CHANGE UP
ENA JO1 AB SER-ACNC: <0.2 AI — SIGNIFICANT CHANGE UP
ENA SM AB FLD QL: <0.2 AI — SIGNIFICANT CHANGE UP
RF+CCP IGG SER-IMP: NEGATIVE — SIGNIFICANT CHANGE UP
SSA-52 (RO52) (ENA) ANTIBODY, IGG: 22 AU/ML — SIGNIFICANT CHANGE UP (ref 0–40)
SSA-60 (RO60) (ENA) ANTIBODY, IGG: 7 AU/ML — SIGNIFICANT CHANGE UP (ref 0–40)
SURGICAL PATHOLOGY STUDY: SIGNIFICANT CHANGE UP

## 2023-08-22 LAB
AUTO DIFF PNL BLD: NEGATIVE — SIGNIFICANT CHANGE UP
C-ANCA SER-ACNC: NEGATIVE — SIGNIFICANT CHANGE UP
GBM IGG SER-ACNC: <0.2 — SIGNIFICANT CHANGE UP (ref 0–0.9)
MPO AB + PR3 PNL SER: SIGNIFICANT CHANGE UP
MYELOPEROXIDASE AB SER-ACNC: <5 UNITS — SIGNIFICANT CHANGE UP
MYELOPEROXIDASE CELLS FLD QL: NEGATIVE — SIGNIFICANT CHANGE UP
P-ANCA SER-ACNC: NEGATIVE — SIGNIFICANT CHANGE UP
PROTEINASE3 AB FLD-ACNC: 10.6 UNITS — SIGNIFICANT CHANGE UP
PROTEINASE3 AB SER-ACNC: NEGATIVE — SIGNIFICANT CHANGE UP

## 2023-08-23 LAB
ANA TITR SER: NEGATIVE — SIGNIFICANT CHANGE UP
DSDNA AB SER-ACNC: <12 IU/ML — SIGNIFICANT CHANGE UP

## 2023-08-27 LAB — HMGCR ANTIBODY, IGG RESULT: <3 UNITS — SIGNIFICANT CHANGE UP (ref 0–19)

## 2023-08-28 LAB — PM/SCL-100 AB SER LINE BLOT-ACNC: <20 UNITS — SIGNIFICANT CHANGE UP

## 2023-09-01 LAB — SURGICAL PATHOLOGY STUDY: SIGNIFICANT CHANGE UP

## 2023-09-05 ENCOUNTER — NON-APPOINTMENT (OUTPATIENT)
Age: 73
End: 2023-09-05

## 2023-09-06 ENCOUNTER — NON-APPOINTMENT (OUTPATIENT)
Age: 73
End: 2023-09-06

## 2023-09-06 ENCOUNTER — TRANSCRIPTION ENCOUNTER (OUTPATIENT)
Age: 73
End: 2023-09-06

## 2023-09-06 ENCOUNTER — APPOINTMENT (OUTPATIENT)
Dept: GASTROENTEROLOGY | Facility: CLINIC | Age: 73
End: 2023-09-06
Payer: MEDICARE

## 2023-09-06 PROCEDURE — 99443: CPT | Mod: 95

## 2023-09-07 LAB
CN-1A AB SER IA-ACNC: <20 UNITS — SIGNIFICANT CHANGE UP
EJ: NEGATIVE — SIGNIFICANT CHANGE UP
ENA JO1 AB SER IA-ACNC: <20 UNITS — SIGNIFICANT CHANGE UP
ENA PM/SCL AB SER-ACNC: <20 UNITS — SIGNIFICANT CHANGE UP
ENA SM+RNP AB SER IA-ACNC: <20 UNITS — SIGNIFICANT CHANGE UP
ENA SS-A IGG SER QL: 26 UNITS — HIGH
FIBRILLARIN AB SER QL: NEGATIVE — SIGNIFICANT CHANGE UP
KU AB SER QL: NEGATIVE — SIGNIFICANT CHANGE UP
MDA-5 (P140)(CADM-140): <20 UNITS — SIGNIFICANT CHANGE UP
MI2 AB SER QL: NEGATIVE — SIGNIFICANT CHANGE UP
NXP-2 (P140): <20 UNITS — SIGNIFICANT CHANGE UP
OJ AB SER QL: NEGATIVE — SIGNIFICANT CHANGE UP
PL12 AB SER QL: NEGATIVE — SIGNIFICANT CHANGE UP
PL7 AB SER QL: NEGATIVE — SIGNIFICANT CHANGE UP
SRP AB SERPL QL: NEGATIVE — SIGNIFICANT CHANGE UP
TIF GAMMA (P155/140): <20 UNITS — SIGNIFICANT CHANGE UP
U2 SNRNP AB SER QL: NEGATIVE — SIGNIFICANT CHANGE UP

## 2023-09-14 LAB — MISCELLANEOUS TEST NAME: SIGNIFICANT CHANGE UP

## 2023-11-02 ENCOUNTER — OUTPATIENT (OUTPATIENT)
Dept: OUTPATIENT SERVICES | Facility: HOSPITAL | Age: 73
LOS: 1 days | End: 2023-11-02
Payer: MEDICARE

## 2023-11-02 VITALS
DIASTOLIC BLOOD PRESSURE: 76 MMHG | TEMPERATURE: 97 F | WEIGHT: 169.98 LBS | OXYGEN SATURATION: 97 % | HEART RATE: 72 BPM | HEIGHT: 60 IN | SYSTOLIC BLOOD PRESSURE: 118 MMHG | RESPIRATION RATE: 16 BRPM

## 2023-11-02 DIAGNOSIS — Z98.890 OTHER SPECIFIED POSTPROCEDURAL STATES: Chronic | ICD-10-CM

## 2023-11-02 DIAGNOSIS — K80.50 CALCULUS OF BILE DUCT WITHOUT CHOLANGITIS OR CHOLECYSTITIS WITHOUT OBSTRUCTION: ICD-10-CM

## 2023-11-02 DIAGNOSIS — Z90.49 ACQUIRED ABSENCE OF OTHER SPECIFIED PARTS OF DIGESTIVE TRACT: Chronic | ICD-10-CM

## 2023-11-02 DIAGNOSIS — Z87.09 PERSONAL HISTORY OF OTHER DISEASES OF THE RESPIRATORY SYSTEM: ICD-10-CM

## 2023-11-02 LAB
ALBUMIN SERPL ELPH-MCNC: 4.3 G/DL — SIGNIFICANT CHANGE UP (ref 3.3–5)
ALBUMIN SERPL ELPH-MCNC: 4.3 G/DL — SIGNIFICANT CHANGE UP (ref 3.3–5)
ALP SERPL-CCNC: 102 U/L — SIGNIFICANT CHANGE UP (ref 40–120)
ALP SERPL-CCNC: 102 U/L — SIGNIFICANT CHANGE UP (ref 40–120)
ALT FLD-CCNC: 13 U/L — SIGNIFICANT CHANGE UP (ref 4–33)
ALT FLD-CCNC: 13 U/L — SIGNIFICANT CHANGE UP (ref 4–33)
ANION GAP SERPL CALC-SCNC: 10 MMOL/L — SIGNIFICANT CHANGE UP (ref 7–14)
ANION GAP SERPL CALC-SCNC: 10 MMOL/L — SIGNIFICANT CHANGE UP (ref 7–14)
AST SERPL-CCNC: 15 U/L — SIGNIFICANT CHANGE UP (ref 4–32)
AST SERPL-CCNC: 15 U/L — SIGNIFICANT CHANGE UP (ref 4–32)
BILIRUB SERPL-MCNC: 0.3 MG/DL — SIGNIFICANT CHANGE UP (ref 0.2–1.2)
BILIRUB SERPL-MCNC: 0.3 MG/DL — SIGNIFICANT CHANGE UP (ref 0.2–1.2)
BUN SERPL-MCNC: 14 MG/DL — SIGNIFICANT CHANGE UP (ref 7–23)
BUN SERPL-MCNC: 14 MG/DL — SIGNIFICANT CHANGE UP (ref 7–23)
CALCIUM SERPL-MCNC: 9.6 MG/DL — SIGNIFICANT CHANGE UP (ref 8.4–10.5)
CALCIUM SERPL-MCNC: 9.6 MG/DL — SIGNIFICANT CHANGE UP (ref 8.4–10.5)
CHLORIDE SERPL-SCNC: 103 MMOL/L — SIGNIFICANT CHANGE UP (ref 98–107)
CHLORIDE SERPL-SCNC: 103 MMOL/L — SIGNIFICANT CHANGE UP (ref 98–107)
CO2 SERPL-SCNC: 24 MMOL/L — SIGNIFICANT CHANGE UP (ref 22–31)
CO2 SERPL-SCNC: 24 MMOL/L — SIGNIFICANT CHANGE UP (ref 22–31)
CREAT SERPL-MCNC: 0.63 MG/DL — SIGNIFICANT CHANGE UP (ref 0.5–1.3)
CREAT SERPL-MCNC: 0.63 MG/DL — SIGNIFICANT CHANGE UP (ref 0.5–1.3)
EGFR: 93 ML/MIN/1.73M2 — SIGNIFICANT CHANGE UP
EGFR: 93 ML/MIN/1.73M2 — SIGNIFICANT CHANGE UP
GLUCOSE SERPL-MCNC: 101 MG/DL — HIGH (ref 70–99)
GLUCOSE SERPL-MCNC: 101 MG/DL — HIGH (ref 70–99)
HCT VFR BLD CALC: 39.2 % — SIGNIFICANT CHANGE UP (ref 34.5–45)
HCT VFR BLD CALC: 39.2 % — SIGNIFICANT CHANGE UP (ref 34.5–45)
HGB BLD-MCNC: 13.2 G/DL — SIGNIFICANT CHANGE UP (ref 11.5–15.5)
HGB BLD-MCNC: 13.2 G/DL — SIGNIFICANT CHANGE UP (ref 11.5–15.5)
MCHC RBC-ENTMCNC: 30.3 PG — SIGNIFICANT CHANGE UP (ref 27–34)
MCHC RBC-ENTMCNC: 30.3 PG — SIGNIFICANT CHANGE UP (ref 27–34)
MCHC RBC-ENTMCNC: 33.7 GM/DL — SIGNIFICANT CHANGE UP (ref 32–36)
MCHC RBC-ENTMCNC: 33.7 GM/DL — SIGNIFICANT CHANGE UP (ref 32–36)
MCV RBC AUTO: 89.9 FL — SIGNIFICANT CHANGE UP (ref 80–100)
MCV RBC AUTO: 89.9 FL — SIGNIFICANT CHANGE UP (ref 80–100)
NRBC # BLD: 0 /100 WBCS — SIGNIFICANT CHANGE UP (ref 0–0)
NRBC # BLD: 0 /100 WBCS — SIGNIFICANT CHANGE UP (ref 0–0)
NRBC # FLD: 0 K/UL — SIGNIFICANT CHANGE UP (ref 0–0)
NRBC # FLD: 0 K/UL — SIGNIFICANT CHANGE UP (ref 0–0)
PLATELET # BLD AUTO: 304 K/UL — SIGNIFICANT CHANGE UP (ref 150–400)
PLATELET # BLD AUTO: 304 K/UL — SIGNIFICANT CHANGE UP (ref 150–400)
POTASSIUM SERPL-MCNC: 4 MMOL/L — SIGNIFICANT CHANGE UP (ref 3.5–5.3)
POTASSIUM SERPL-MCNC: 4 MMOL/L — SIGNIFICANT CHANGE UP (ref 3.5–5.3)
POTASSIUM SERPL-SCNC: 4 MMOL/L — SIGNIFICANT CHANGE UP (ref 3.5–5.3)
POTASSIUM SERPL-SCNC: 4 MMOL/L — SIGNIFICANT CHANGE UP (ref 3.5–5.3)
PROT SERPL-MCNC: 8 G/DL — SIGNIFICANT CHANGE UP (ref 6–8.3)
PROT SERPL-MCNC: 8 G/DL — SIGNIFICANT CHANGE UP (ref 6–8.3)
RBC # BLD: 4.36 M/UL — SIGNIFICANT CHANGE UP (ref 3.8–5.2)
RBC # BLD: 4.36 M/UL — SIGNIFICANT CHANGE UP (ref 3.8–5.2)
RBC # FLD: 14 % — SIGNIFICANT CHANGE UP (ref 10.3–14.5)
RBC # FLD: 14 % — SIGNIFICANT CHANGE UP (ref 10.3–14.5)
SODIUM SERPL-SCNC: 137 MMOL/L — SIGNIFICANT CHANGE UP (ref 135–145)
SODIUM SERPL-SCNC: 137 MMOL/L — SIGNIFICANT CHANGE UP (ref 135–145)
WBC # BLD: 8.15 K/UL — SIGNIFICANT CHANGE UP (ref 3.8–10.5)
WBC # BLD: 8.15 K/UL — SIGNIFICANT CHANGE UP (ref 3.8–10.5)
WBC # FLD AUTO: 8.15 K/UL — SIGNIFICANT CHANGE UP (ref 3.8–10.5)
WBC # FLD AUTO: 8.15 K/UL — SIGNIFICANT CHANGE UP (ref 3.8–10.5)

## 2023-11-02 PROCEDURE — 93010 ELECTROCARDIOGRAM REPORT: CPT

## 2023-11-02 RX ORDER — ROSUVASTATIN CALCIUM 5 MG/1
1 TABLET ORAL
Refills: 0 | DISCHARGE

## 2023-11-02 RX ORDER — ASPIRIN/CALCIUM CARB/MAGNESIUM 324 MG
1 TABLET ORAL
Refills: 0 | DISCHARGE

## 2023-11-02 NOTE — H&P PST ADULT - PROBLEM SELECTOR PLAN 2
Pt states she has been stable for >5 years, last visit to pulm was ~4 years ago, follows up with PCP, will request last medical note. Crackles heard on exam - pt instructed to obtain pulm eval preop, pt able to verbalize understanding.  Surgeon notified via email.

## 2023-11-02 NOTE — H&P PST ADULT - NSICDXFAMILYHX_GEN_ALL_CORE_FT
FAMILY HISTORY:  Mother  Still living? No  Family history of pulmonary fibrosis, Age at diagnosis: Age Unknown

## 2023-11-02 NOTE — H&P PST ADULT - HISTORY OF PRESENT ILLNESS
74 yo female who was admitted to [facility] for choledocholithiasis 8/2023 s/p ERCP stone removal and CBD stent placement, s/p cholecystectomy.  Pt is scheduled for ERCP stent removal.

## 2023-11-02 NOTE — H&P PST ADULT - PROBLEM SELECTOR PLAN 1
Pt is scheduled for ERCP stent removal on 11/7/23.  Verbal and written pre op instructions reviewed with patient and pt able to verbalize understanding.

## 2023-11-02 NOTE — H&P PST ADULT - NEGATIVE GENERAL GENITOURINARY SYMPTOMS
Current Events no hematuria/no flank pain L/no flank pain R/no bladder infections/no incontinence/no dysuria

## 2023-11-02 NOTE — H&P PST ADULT - NSANTHOSAYNRD_GEN_A_CORE
No. JOZEF screening performed.  STOP BANG Legend: 0-2 = LOW Risk; 3-4 = INTERMEDIATE Risk; 5-8 = HIGH Risk

## 2023-11-02 NOTE — H&P PST ADULT - NSICDXPASTMEDICALHX_GEN_ALL_CORE_FT
PAST MEDICAL HISTORY:  Choledocholithiasis     Gallstones     Hyperlipidemia     Hypertension     Pulmonary fibrosis

## 2023-11-02 NOTE — H&P PST ADULT - OTHER CARE PROVIDERS
Dr. Chito Cunha(pul) [phone] last visit in 2019 Dr. Chito Cunha(pul) [phone] last visit in 2019         pulm seen at [facility] Dr. Levon Casper [phone]

## 2023-11-02 NOTE — H&P PST ADULT - NSICDXPASTSURGICALHX_GEN_ALL_CORE_FT
PAST SURGICAL HISTORY:  History of D&C      PAST SURGICAL HISTORY:  History of D&C     S/P cholecystectomy     S/P ERCP

## 2023-11-06 ENCOUNTER — APPOINTMENT (OUTPATIENT)
Dept: PULMONOLOGY | Facility: CLINIC | Age: 73
End: 2023-11-06
Payer: MEDICARE

## 2023-11-06 VITALS
SYSTOLIC BLOOD PRESSURE: 156 MMHG | OXYGEN SATURATION: 95 % | DIASTOLIC BLOOD PRESSURE: 86 MMHG | HEIGHT: 61 IN | HEART RATE: 67 BPM | BODY MASS INDEX: 32.28 KG/M2 | WEIGHT: 171 LBS | RESPIRATION RATE: 17 BRPM

## 2023-11-06 PROBLEM — K80.50 CALCULUS OF BILE DUCT WITHOUT CHOLANGITIS OR CHOLECYSTITIS WITHOUT OBSTRUCTION: Chronic | Status: ACTIVE | Noted: 2023-11-02

## 2023-11-06 PROBLEM — K80.20 CALCULUS OF GALLBLADDER WITHOUT CHOLECYSTITIS WITHOUT OBSTRUCTION: Chronic | Status: ACTIVE | Noted: 2023-11-02

## 2023-11-06 PROCEDURE — 99214 OFFICE O/P EST MOD 30 MIN: CPT

## 2023-11-07 ENCOUNTER — OUTPATIENT (OUTPATIENT)
Dept: OUTPATIENT SERVICES | Facility: HOSPITAL | Age: 73
LOS: 1 days | Discharge: HOME | End: 2023-11-07
Payer: MEDICARE

## 2023-11-07 ENCOUNTER — TRANSCRIPTION ENCOUNTER (OUTPATIENT)
Age: 73
End: 2023-11-07

## 2023-11-07 ENCOUNTER — APPOINTMENT (OUTPATIENT)
Dept: GASTROENTEROLOGY | Facility: HOSPITAL | Age: 73
End: 2023-11-07

## 2023-11-07 VITALS
SYSTOLIC BLOOD PRESSURE: 142 MMHG | WEIGHT: 151.9 LBS | RESPIRATION RATE: 20 BRPM | TEMPERATURE: 99 F | DIASTOLIC BLOOD PRESSURE: 81 MMHG | HEIGHT: 60 IN | HEART RATE: 65 BPM | OXYGEN SATURATION: 96 %

## 2023-11-07 VITALS
SYSTOLIC BLOOD PRESSURE: 131 MMHG | OXYGEN SATURATION: 96 % | HEART RATE: 71 BPM | DIASTOLIC BLOOD PRESSURE: 74 MMHG | RESPIRATION RATE: 19 BRPM

## 2023-11-07 DIAGNOSIS — Z98.890 OTHER SPECIFIED POSTPROCEDURAL STATES: Chronic | ICD-10-CM

## 2023-11-07 DIAGNOSIS — Z90.49 ACQUIRED ABSENCE OF OTHER SPECIFIED PARTS OF DIGESTIVE TRACT: Chronic | ICD-10-CM

## 2023-11-07 DIAGNOSIS — K80.50 CALCULUS OF BILE DUCT WITHOUT CHOLANGITIS OR CHOLECYSTITIS WITHOUT OBSTRUCTION: ICD-10-CM

## 2023-11-07 PROCEDURE — 43264 ERCP REMOVE DUCT CALCULI: CPT | Mod: GC

## 2023-11-07 PROCEDURE — 43275 ERCP REMOVE FORGN BODY DUCT: CPT | Mod: GC

## 2023-11-07 PROCEDURE — 74330 X-RAY BILE/PANC ENDOSCOPY: CPT | Mod: 26,GC

## 2023-11-07 DEVICE — CATH BLLN EXTRACT DIST GUIDE WIRE 15MM 3LUM: Type: IMPLANTABLE DEVICE | Status: FUNCTIONAL

## 2023-11-07 DEVICE — GWIRE JAGTOME REVOLUTION RX 260CM/0.025IN: Type: IMPLANTABLE DEVICE | Status: FUNCTIONAL

## 2023-11-07 RX ORDER — ASPIRIN/CALCIUM CARB/MAGNESIUM 324 MG
1 TABLET ORAL
Refills: 0 | DISCHARGE

## 2023-11-07 RX ORDER — ERGOCALCIFEROL 1.25 MG/1
0 CAPSULE ORAL
Refills: 0 | DISCHARGE

## 2023-11-07 RX ORDER — ROSUVASTATIN CALCIUM 5 MG/1
0.5 TABLET ORAL
Refills: 0 | DISCHARGE

## 2023-11-07 NOTE — ASU DISCHARGE PLAN (ADULT/PEDIATRIC) - NSDISCH_COLONOSCOPY_ENDO_ALL_CORE_FT
If a colonoscopy was performed you might notice a few drops of blood on your underwear or you might see blood on the toilet paper after you use the bathroom. This is caused by irritation to the bowel during the procedure and is not a problem. However if you have any more heavy bleeding (more than 2 tablespoons of bright red blood) contact your doctor right away. Acitretin Pregnancy And Lactation Text: This medication is Pregnancy Category X and should not be given to women who are pregnant or may become pregnant in the future. This medication is excreted in breast milk.

## 2023-11-07 NOTE — PRE PROCEDURE NOTE - PRE PROCEDURE EVALUATION
Attending Physician:            HÉCTOR                Procedure: ERCP    Indication for Procedure: stent removal and reevaluation  ________________________________________________________  PAST MEDICAL & SURGICAL HISTORY:  Pulmonary fibrosis      Hyperlipidemia      Hypertension      Gallstones      Choledocholithiasis      History of D&C      S/P ERCP      S/P cholecystectomy        ALLERGIES:  No Known Drug Allergies  shellfish (Hives; Rash)    HOME MEDICATIONS:  Ecotrin Adult Low Strength 81 mg oral delayed release tablet: 1 tab(s) orally once a day LD 10/31/2023  rosuvastatin 40 mg oral tablet: 0.5 tab(s) orally once a day  Vitamin D: 1 tab orally once a day    AICD/PPM: [ ] yes   [ ] no    PERTINENT LAB DATA:                      PHYSICAL EXAMINATION:    VSS    Constitutional: NAD  HEENT: PERRLA, EOMI,    Neck:  No JVD  Respiratory: CTAB/L  Cardiovascular: S1 and S2  Gastrointestinal: BS+, soft, NT/ND  Extremities: No peripheral edema  Neurological: A/O x 3, no focal deficits  Psychiatric: Normal mood, normal affect  Skin: No rashes    ASA Class: I [ ]  II [ ]  III [x ]  IV [ ]    COMMENTS:    The patient is a suitable candidate for the planned procedure unless box checked [ ]  No, explain:    I explained the risks (bleeding, infection perforation, pancreatitis, Cv risk, anesthesia risk)/b/a with the patient. The patient agrees to proceed.

## 2023-11-10 DIAGNOSIS — R76.8 OTHER SPECIFIED ABNORMAL IMMUNOLOGICAL FINDINGS IN SERUM: ICD-10-CM

## 2023-12-27 ENCOUNTER — APPOINTMENT (OUTPATIENT)
Dept: RHEUMATOLOGY | Facility: CLINIC | Age: 73
End: 2023-12-27
Payer: MEDICARE

## 2023-12-27 VITALS
HEART RATE: 71 BPM | HEIGHT: 61 IN | SYSTOLIC BLOOD PRESSURE: 133 MMHG | DIASTOLIC BLOOD PRESSURE: 81 MMHG | BODY MASS INDEX: 32.1 KG/M2 | WEIGHT: 170 LBS | OXYGEN SATURATION: 96 %

## 2023-12-27 DIAGNOSIS — M25.50 PAIN IN UNSPECIFIED JOINT: ICD-10-CM

## 2023-12-27 DIAGNOSIS — M54.50 LOW BACK PAIN, UNSPECIFIED: ICD-10-CM

## 2023-12-27 PROCEDURE — 99204 OFFICE O/P NEW MOD 45 MIN: CPT

## 2023-12-27 RX ORDER — ACETAMINOPHEN ER 650 MG TABLET,EXTENDED RELEASE 650 MG
650 TABLET, EXTENDED RELEASE ORAL
Qty: 60 | Refills: 0 | Status: ACTIVE | COMMUNITY
Start: 2023-12-27 | End: 1900-01-01

## 2023-12-27 NOTE — ASSESSMENT
[FreeTextEntry1] : 72 yo F w/  bilateral peripheral reticular pattern, GGO and traction bronchiectasis likely pulmonary fibrosis since 2019 , most notable in RML and LLL. Joint pain more consistent with osteoarthritis rather than inflammatory arthritis Positive: SSA-52kD 26 (low pos), aldolase 12.2, . IgG 1664 (elevated), ESR 45 (elevated) Negative: CRP< 3, [place] (in 6/2023 [place] 1:160), myomarker panel 3 (remainder), Anti-Aislinn, Anti smith, RNP, dsDNA, RF, CCP, SSA-60kD, SSB, Centromere, scl-70, ANCA, PR3, MPO, SPEP, IF u, HCV  Plan: # ILD -complete rheumatologic workup for possible ILD as detailed below -xrays of affected joints as detailed below -pending PFT later in 12/2023  # Lymphedema/lipedema -recommend compression stockings -vascular referral  # Bilateral knee pain, likely knee OA -recommend PT -knee xrays -tylenol, diclofenac gel  -GI ppx: PPI -Infection screening: HBV HCV Quantiferon - check today in the event of immunosuppressive therapy in the future -Immunizations: COVID vaccine, COVID ab, flu vaccine, pneumonia vaccine, shingrix -Bone health: DEXA, Vit D - discuss next visit -Malignancy screening: colonoscopy 2/2020, mammogram 3 years ago, not utd PAP  -RTC 1 month

## 2023-12-27 NOTE — HISTORY OF PRESENT ILLNESS
[FreeTextEntry1] : 74 yo F w/ pulmonary fibrosis, HLD, ovarian cystectomy, ILD referred by pulmonary for rheumatologic evaluation.  HPI  # ILD/Pulmonary fibrosis She was seen by inpatient pulmonary team in 8/2023 prior to procedure because CT chest showed bilateral peripheral reticular pattern, GGO and traction bronchiectasis, most notable in RML and LLL. Patient was incidentally found to have pulmonary fibrosis in 2019 after routine CXR in [place]. She was referred to Dr. Cunha (Pulm) and had PFTs done and was told her disease was mild with plan to monitor off medication. However, she was lost to follow up since 2020 due to the covid pandemic. She endorses noticing no change in her breathing although her ET has been limited by back and joint pain. Exercise tolerance is 2 blocks (limited due to pain in knees and ankle). No cough, sputum production, fever, chills, sob at rest, FLANAGAN. SaO2 95% at rest, Has not had PFT yet, scheduled 12/28 Inpatient ILD serologic workup in 11/2023:  Positive: SSA-52kD 26 (low pos), aldolase 12.2, . IgG 1664 (elevated), ESR 45 (elevated) Negative: CRP< 3, [place] (in 6/2023 [place] 1:160), myomarker panel 3 (remainder), Anti-Aislinn, Anti smith, RNP, dsDNA, RF, CCP, SSA-60kD, SSB, Centromere, scl-70, ANCA, PR3, MPO, SPEP, IF u, HCV ROS + dry eyes + myalgia no rash, photosensitivity, hair loss, oral or nasal ulcers, dry mouth, fatigue, muscle weakness, fevers, chills, weight loss, Raynauds, numbness, tingling respiratory or gastrointestinal complaints  # Joint pain -Many years of joint pain, mostly knee, ankle and foot pain -she saw a rheumatologist, was offered steroid injections which she deferred. 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

## 2023-12-27 NOTE — CONSULT LETTER
[Dear  ___] : Dear  [unfilled], [Consult Letter:] : I had the pleasure of evaluating your patient, [unfilled]. [Please see my note below.] : Please see my note below. [Consult Closing:] : Thank you very much for allowing me to participate in the care of this patient.  If you have any questions, please do not hesitate to contact me. [Sincerely,] : Sincerely, [FreeTextEntry2] : Dr. Montenegro [FreeTextEntry3] : Dr. Joellen Chairez

## 2023-12-27 NOTE — PHYSICAL EXAM
[General Appearance - Alert] : alert [General Appearance - In No Acute Distress] : in no acute distress [General Appearance - Well Nourished] : well nourished [Nasal Cavity] : the nasal mucosa and septum were normal [Sclera] : the sclera and conjunctiva were normal [Neck Cervical Mass (___cm)] : no neck mass was observed [Auscultation Breath Sounds / Voice Sounds] : lungs were clear to auscultation bilaterally [Heart Sounds] : normal S1 and S2 [Heart Sounds Gallop] : no gallops [Murmurs] : no murmurs [Cervical Lymph Nodes Enlarged Posterior Bilaterally] : posterior cervical [Cervical Lymph Nodes Enlarged Anterior Bilaterally] : anterior cervical [Supraclavicular Lymph Nodes Enlarged Bilaterally] : supraclavicular [No Spinal Tenderness] : no spinal tenderness [Musculoskeletal - Swelling] : no joint swelling seen [] : no rash [Sensation] : the sensory exam was normal to light touch and pinprick [Motor Exam] : the motor exam was normal [Oriented To Time, Place, And Person] : oriented to person, place, and time [FreeTextEntry1] : bilateral lymphedema

## 2023-12-28 ENCOUNTER — APPOINTMENT (OUTPATIENT)
Dept: PULMONOLOGY | Facility: CLINIC | Age: 73
End: 2023-12-28
Payer: MEDICARE

## 2023-12-28 VITALS
DIASTOLIC BLOOD PRESSURE: 80 MMHG | WEIGHT: 170 LBS | HEIGHT: 61 IN | OXYGEN SATURATION: 96 % | HEART RATE: 71 BPM | BODY MASS INDEX: 32.1 KG/M2 | SYSTOLIC BLOOD PRESSURE: 126 MMHG

## 2023-12-28 PROCEDURE — 94729 DIFFUSING CAPACITY: CPT

## 2023-12-28 PROCEDURE — 94010 BREATHING CAPACITY TEST: CPT

## 2023-12-28 PROCEDURE — ZZZZZ: CPT

## 2023-12-28 PROCEDURE — 94726 PLETHYSMOGRAPHY LUNG VOLUMES: CPT

## 2023-12-28 PROCEDURE — 99214 OFFICE O/P EST MOD 30 MIN: CPT | Mod: 25

## 2023-12-28 NOTE — HISTORY OF PRESENT ILLNESS
[TextBox_4] : 72 yo F w/ pulmonary fibrosis, HLD, ovarian cystectomy who was admitted to [facility] 8/2023 with 3 days of epigastric pain and found to have choleedocholithiasis. She underwent ERCP. Now presents for pulmonary clearance for stent removal.  She was seen by inpatient pulmonary team in 8/2023 prior to procuedre because CT chest showed  bilateral peripheral reticular pattern, GGO and traction bronchiectasis, most notable in RML and LLL.  Patient was incidentally found to have pulmonary fibrosis in 2019 after routine CXR in [place]. She was referred to Dr. Cunha (Pulm) and had PFTs done and was told her disease was mild with plan to monitor off medication. However, she was lost to follow up since 2020 due to the covid pandemic. She endorses noticing no change in her breathing although her ET has been limited by back and joint pain. Exercise tolerance is 2 blocks (limited due to pain in knees and ankle).  No cough, sputum production, fever, chills, sob at rest, FLANAGAN.   Inpatient ILD serologic workup was sent: Anti-Aislinn, Anti smith, Anti ribonuclear proptein, RF, DS DNA, CCP, Centromere, Aislinn-1, Myomarker panel, Anca antibodies negative. SS-A 26 (negative on repeat), Aldolase 12.2 elevated.  12/29: Patient with no  change in symptoms. Undergoing rheumatologic workup.

## 2023-12-28 NOTE — ASSESSMENT
[FreeTextEntry1] : 74 yo F w/ pulmonary fibrosis, HLD, ovarian cystectomy who was admitted to [facility] 8/2023 with 3 days of epigastric pain and found to have choleedocholithiasis. She underwent ERCP. Now presents for pulmonary clearance for ERCP stent removal.  Probable IPF: CT chest showed bilateral peripheral reticular pattern, GGO and traction bronchiectasis, most notable in RML and LLL. Patient was incidentally found to have pulmonary fibrosis in 2019 after routine CXR in [place]. She was referred to Dr. Cunha (Pulm) and had PFTs done and was told her disease was mild with plan to monitor off medication. However, she was lost to follow up since 2020 due to the covid pandemic. She endorses noticing no change in her breathing although her ET has been limited by back and joint pain. Exercise tolerance is 2 blocks (limited due to pain in knees and ankle). - serologic workup was sent 8/2023 at [facility]: Anti-Aislinn, Anti garcia, Anti ribonuclear proptein, RF, DS DNA, CCP, Centromere, Aislinn-1, Myomarker panel, Anca antibodies negative. SS-A 26 (negative on repeat), Aldolase 12.2 elevated - She is on room air with SpO2 95% on room air. Pulmonary symptoms are stable. She tolerated prior ERCP 8/2023 without issues. She is optimized for ERCP stent removal tomorrow. No further testing required prior to procedure.  - She will need to follow up post procedure to discuss possibel anti fibrotic treatment  - PFT prior to next appt  - Will bring copies of prior PFT (from Dr. Cunha) at next visit  - Refer to rheumatology given knee, ankle pain    12/28: No change in symptoms. Suspect IPF. If rheum workup negative will start ofev on next visit.

## 2023-12-28 NOTE — PHYSICAL EXAM
[No Acute Distress] : no acute distress [Normal Oropharynx] : normal oropharynx [Normal Appearance] : normal appearance [Normal Rate/Rhythm] : normal rate/rhythm [Normal S1, S2] : normal s1, s2 [No Murmurs] : no murmurs [No Resp Distress] : no resp distress [No Abnormalities] : no abnormalities [Benign] : benign [Normal Gait] : normal gait [No Clubbing] : no clubbing [No Edema] : no edema [FROM] : FROM [Normal Color/ Pigmentation] : normal color/ pigmentation [No Focal Deficits] : no focal deficits [Oriented x3] : oriented x3 [Normal Affect] : normal affect [TextBox_68] : bibasilar crackles

## 2024-01-01 LAB
24R-OH-CALCIDIOL SERPL-MCNC: 82.2 PG/ML
ACE BLD-CCNC: 24 U/L
ALBUPE: 17.2 %
ALDOLASE SERPL-CCNC: 4 U/L
ALPHA1UPE: 27.6 %
ALPHA2UPE: 19.8 %
APPEARANCE: CLEAR
BACTERIA: ABNORMAL /HPF
BETAUPE: 12.8 %
BILIRUBIN URINE: NEGATIVE
BLOOD URINE: NEGATIVE
CAST: 0 /LPF
CK SERPL-CCNC: 103 U/L
COLOR: YELLOW
CREAT SPEC-SCNC: 90 MG/DL
CREAT/PROT UR: 0.1 RATIO
CRP SERPL-MCNC: <3 MG/L
EPITHELIAL CELLS: 1 /HPF
ERYTHROCYTE [SEDIMENTATION RATE] IN BLOOD BY WESTERGREN METHOD: 49 MM/HR
FERRITIN SERPL-MCNC: 633 NG/ML
GAMMAUPE: 22.6 %
GLUCOSE QUALITATIVE U: NEGATIVE MG/DL
HBV CORE IGG+IGM SER QL: NONREACTIVE
HBV SURFACE AB SER QL: NONREACTIVE
HBV SURFACE AG SER QL: NONREACTIVE
IGA 24H UR QL IFE: NORMAL
IGG SER QL IEP: 1734 MG/DL
IGG1 SER-MCNC: 821 MG/DL
IGG2 SER-MCNC: 768 MG/DL
IGG3 SER-MCNC: 72 MG/DL
IGG4 SER-MCNC: 130.6 MG/DL
KAPPA LC 24H UR QL: NORMAL
KETONES URINE: NEGATIVE MG/DL
LEUKOCYTE ESTERASE URINE: NEGATIVE
M PROTEIN SPEC IFE-MCNC: NORMAL
M TB IFN-G BLD-IMP: NEGATIVE
MICROSCOPIC-UA: NORMAL
MYOGLOBIN SERPL-MCNC: 23 NG/ML
NITRITE URINE: POSITIVE
PH URINE: 6
PROT PATTERN 24H UR ELPH-IMP: NORMAL
PROT UR-MCNC: 8 MG/DL
PROT UR-MCNC: <4 MG/DL
PROT UR-MCNC: <4 MG/DL
PROTEIN URINE: NEGATIVE MG/DL
QUANTIFERON TB PLUS MITOGEN MINUS NIL: 6.87 IU/ML
QUANTIFERON TB PLUS NIL: 0.02 IU/ML
QUANTIFERON TB PLUS TB1 MINUS NIL: 0.05 IU/ML
QUANTIFERON TB PLUS TB2 MINUS NIL: 0.04 IU/ML
RED BLOOD CELLS URINE: 0 /HPF
RNA POLYMERASE III IGG: 31 UNITS
SPECIFIC GRAVITY URINE: 1.02
UROBILINOGEN URINE: 1 MG/DL
WHITE BLOOD CELLS URINE: 1 /HPF

## 2024-01-02 ENCOUNTER — NON-APPOINTMENT (OUTPATIENT)
Age: 74
End: 2024-01-02

## 2024-01-02 LAB — HLA-B27 QL NAA+PROBE: NORMAL

## 2024-01-22 ENCOUNTER — APPOINTMENT (OUTPATIENT)
Dept: GASTROENTEROLOGY | Facility: CLINIC | Age: 74
End: 2024-01-22
Payer: MEDICARE

## 2024-01-22 VITALS
DIASTOLIC BLOOD PRESSURE: 79 MMHG | BODY MASS INDEX: 32.1 KG/M2 | HEART RATE: 79 BPM | HEIGHT: 61 IN | OXYGEN SATURATION: 94 % | WEIGHT: 170 LBS | SYSTOLIC BLOOD PRESSURE: 123 MMHG

## 2024-01-22 DIAGNOSIS — R79.89 OTHER SPECIFIED ABNORMAL FINDINGS OF BLOOD CHEMISTRY: ICD-10-CM

## 2024-01-22 DIAGNOSIS — K80.20 CALCULUS OF GALLBLADDER W/OUT CHOLECYSTITIS W/OUT OBSTRUCTION: ICD-10-CM

## 2024-01-22 DIAGNOSIS — K80.50 CALCULUS OF BILE DUCT W/OUT CHOLANGITIS OR CHOLECYSTITIS W/OUT OBSTRUCTION: ICD-10-CM

## 2024-01-22 DIAGNOSIS — K76.0 FATTY (CHANGE OF) LIVER, NOT ELSEWHERE CLASSIFIED: ICD-10-CM

## 2024-01-22 PROCEDURE — 99214 OFFICE O/P EST MOD 30 MIN: CPT

## 2024-01-22 NOTE — REVIEW OF SYSTEMS
[Anxiety] : anxiety [Negative] : Heme/Lymph [FreeTextEntry4] : Chronic hypogeusia-?  Since COVID or other infection. [FreeTextEntry8] : Urinary frequency. [FreeTextEntry9] : Joint pain.  Back pain.  Lower extremity swelling.

## 2024-01-22 NOTE — ASSESSMENT
[FreeTextEntry1] : Impression:  #1 history symptomatic cholelithiasis-episode choledocholithiasis complicated by cholangitis 8/2023.  Status postcholecystectomy.  Status post ERCP/ES with biliary stent-subsequently removed.  Stable at current time.  #2 hepatic steatosis-consistent with MASH with risk factors of obesity and hyperlipidemia.  Normal liver enzymes at most recent assessment.  Hepatic FibroScan previously noted for F0--1 fibrosis and S3 steatosis.  #3 colon cancer screening-reports normal colonoscopy (per patient) approximately 2020.  Patient average risk.  #4 periampullary duodenal diverticulum.  General medical problems:  -Pulmonary fibrosis/interstitial lung disease.  -Hyperlipidemia.  -Obesity (BMI 32.12).  -Elevated ferritin.  -COVID 9/2022.  -Chronic lower extremity lymphedema.  -Osteoarthritis.

## 2024-01-22 NOTE — PHYSICAL EXAM
[Alert] : alert [Normal Voice/Communication] : normal voice/communication [No Acute Distress] : no acute distress [Obese (BMI >= 30)] : obese (BMI >= 30) [Sclera] : the sclera and conjunctiva were normal [Normal Appearance] : the appearance of the neck was normal [No Neck Mass] : no neck mass was observed [No Respiratory Distress] : no respiratory distress [No Acc Muscle Use] : no accessory muscle use [Respiration, Rhythm And Depth] : normal respiratory rhythm and effort [Normal S1, S2] : normal S1 and S2 [Heart Rate And Rhythm] : heart rate was normal and rhythm regular [Murmurs] : no murmurs [Bowel Sounds] : normal bowel sounds [No Masses] : no abdominal mass palpated [Abdomen Tenderness] : non-tender [Abdomen Soft] : soft [] : no hepatosplenomegaly [Cervical Lymph Nodes Enlarged Posterior Bilaterally] : no posterior cervical lymphadenopathy [Supraclavicular Lymph Nodes Enlarged Bilaterally] : no supraclavicular lymphadenopathy [Cervical Lymph Nodes Enlarged Anterior Bilaterally] : no anterior cervical lymphadenopathy [Abnormal Walk] : normal gait [No Clubbing, Cyanosis] : no clubbing or cyanosis of the fingernails [Normal Color / Pigmentation] : normal skin color and pigmentation [Oriented To Time, Place, And Person] : oriented to person, place, and time [Normal Affect] : the affect was normal [Normal Insight/Judgment] : insight and judgment were intact [Normal Mood] : the mood was normal [de-identified] : Bibasilar "sticky" crackles (left > right).  Few expiratory wheezes right upper lung field. [de-identified] : Bilateral lower extremity lymphedema. [de-identified] : Obese abdomen.  Healed laparoscopic cholecystectomy scars.  No mass or hepatosplenomegaly.

## 2024-01-22 NOTE — HISTORY OF PRESENT ILLNESS
[FreeTextEntry1] : Office consultation on 2024.  The patient is a 74-year-old woman evaluated for consultation for follow-up after episode of symptomatic cholelithiasis complicated by choledocholithiasis and cholangitis.  PMD: Dr. Tigre Cheatham.  Experienced an episode of abdominal pain 2023.  Imaging noted for CBD stones with rising liver enzymes prompting ERCP on 2023.  Examination noted for distal esophagus with 1 cm tongue of salmon-colored mucosa.  Biopsy at that site noted for columnar lined glandular mucosa with chronic inflammation but negative for intestinal metaplasia and dysplasia.  Stomach and duodenum normal.  Periampullary diverticulum noted.  Dilated CBD with filling defects noted.  Biliary sphincterotomy performed with removal of 2 stones along with pus.  A 10 Japanese 7 cm long stent was placed.  Subsequently underwent laparoscopic cholecystectomy 2023 with pathology noted for active chronic cholecystitis and cholelithiasis.  Follow-up ERCP was performed 2023 at which time the previously placed biliary stent was removed with egress of yellow stones.  Few small stones were swept clear.  Previously had consulted with hepatology regarding fatty liver.  Ultrasound abdomen 2022 noted for hepatic steatosis.  Focal hepatic lesions not detected.  Hepatic FibroScan 3/14/2022 noted for F0-F1 fibrosis (none to mild) and S3-stage III steatosis (67% or more).  Per hepatology note, has chronically elevated ferritin (previously 708) with MRI not indicating iron overload and with genetic hemochromatosis testing negative.  Per hepatology, MRI 2022 showed moderate steatosis with fat fraction 17%.  Patient indicates doing well from GI standpoint.  No abdominal pain since issue of symptomatic cholelithiasis of 2023.  Vague regarding particular symptoms experienced at that time.  Otherwise, as noted, no current complaint of abdominal pain.  Feels well at current time.  Denies fever.  Appetite stable and weight unchanged.  Unsuccessful at weight loss.  Denies current complaints of oral ulcers or dysphagia.  Infrequently can experience heartburn described as typical retrosternal burning primarily postprandial following "heavy food" such as greasy food or meat.  Might have an episode of heartburn every 2 weeks.  Similarly, episodes of regurgitation are infrequent.  Denies current complaints of nausea, vomiting, early satiety or abdominal pain.  Has formed bowel movements daily to every other day without any current complaints of diarrhea or constipation.  At times might feel somewhat constipated but doing well at current time.  Review of laboratory assessment of 2023 submitted by PMD: CBC: WBC 7300, hemoglobin 13.8, hematocrit 41.9, platelet count 306,000. -CMP: Creatinine 0.63.  Total protein 8, albumin 4.3, bilirubin 1.4, alkaline phosphatase 96, AST 16, ALT 16. -Lipid profile: Triglycerides 280.  Cholesterol 296.  .  Review of laboratory assessment of 2023 submitted by rheumatology: -Ferritin 633. -. -CRP <3. -Serum myoglobin borderline low at 23. -IgG4 level borderline elevated at 130.6.  IgG 1734. -ESR 49. -HBV serology negative. -ACE level 24. -RNA polymerase 3 IgG elevated at 31.  PMH: -Past diseases: Obesity, hyperlipidemia, pulmonary fibrosis/interstitial lung disease, osteoarthritis, cholelithiasis/choledocholithiasis, duodenal diverticulum, hepatic steatosis, elevated ferritin, COVID 2022, lower extremity lymphedema. -Ovarian cystectomy, laparoscopic cholecystectomy 2023. -Allergies: None (itching from shellfish). -Family history: Father  age 91; pneumonia.  Mother  age 66; pulmonary fibrosis, status postcholecystectomy.  Patient has 3 siblings.  Family history colon cancer not reported. -Social history: Tobacco-none; remote exposure).  Alcohol-none.  Caffeine-2 cups daily.  .  Patient has 3 daughters.  Born in [place].  Retired . -Medications: Rosuvastatin 20 mg, Ecotrin 81 mg.

## 2024-01-22 NOTE — REASON FOR VISIT
[Consultation] : a consultation visit [Spouse] : spouse [FreeTextEntry1] : for GI [id number] after episode choledocholithiasis.

## 2024-01-22 NOTE — ADDENDUM
[FreeTextEntry1] : Plan:  #1 patient presented for GI consultation for follow-up regarding recent episode of symptomatic cholelithiasis and biliary colic complicated by CBD stone and cholangitis.  Patient's  present throughout interview and examination.  Clinical course at that time reviewed and overall favorable outcome discussed.  Overall, doing well from GI standpoint at current time and no current GI symptoms reported.  #2 history of hepatic steatosis discussed.  Risk factor of obesity reviewed.  Goal of dietary modification with reduced caloric intake and exercise as tolerated achieving weight loss as treatment for hepatic steatosis was advised.  Liver enzymes will be periodically monitored.  #3 patient and  were advised to obtain prior colonoscopy report to permit review of findings.  Timing of follow-up screening colonoscopy will be planned accordingly.  #4 office revisit in 6 months.

## 2024-01-22 NOTE — CONSULT LETTER
[Dear  ___] : Dear  [unfilled], [Consult Letter:] : I had the pleasure of evaluating your patient, [unfilled]. [( Thank you for referring [unfilled] for consultation for _____ )] : Thank you for referring [unfilled] for consultation for [unfilled] [Please see my note below.] : Please see my note below. [Consult Closing:] : Thank you very much for allowing me to participate in the care of this patient.  If you have any questions, please do not hesitate to contact me. [Sincerely,] : Sincerely, [FreeTextEntry2] : Dr. Tigre Cheatham [FreeTextEntry3] : Emiliano Caldera MD

## 2024-02-13 ENCOUNTER — RX RENEWAL (OUTPATIENT)
Age: 74
End: 2024-02-13

## 2024-03-13 ENCOUNTER — RX RENEWAL (OUTPATIENT)
Age: 74
End: 2024-03-13

## 2024-03-26 ENCOUNTER — APPOINTMENT (OUTPATIENT)
Dept: NEUROLOGY | Facility: CLINIC | Age: 74
End: 2024-03-26

## 2024-04-15 ENCOUNTER — RESULT REVIEW (OUTPATIENT)
Age: 74
End: 2024-04-15

## 2024-04-15 ENCOUNTER — APPOINTMENT (OUTPATIENT)
Dept: RADIOLOGY | Facility: CLINIC | Age: 74
End: 2024-04-15
Payer: MEDICARE

## 2024-04-15 ENCOUNTER — OUTPATIENT (OUTPATIENT)
Dept: OUTPATIENT SERVICES | Facility: HOSPITAL | Age: 74
LOS: 1 days | End: 2024-04-15
Payer: MEDICARE

## 2024-04-15 DIAGNOSIS — Z98.890 OTHER SPECIFIED POSTPROCEDURAL STATES: Chronic | ICD-10-CM

## 2024-04-15 DIAGNOSIS — M25.50 PAIN IN UNSPECIFIED JOINT: ICD-10-CM

## 2024-04-15 PROCEDURE — 73610 X-RAY EXAM OF ANKLE: CPT | Mod: 26,50

## 2024-04-15 PROCEDURE — 73562 X-RAY EXAM OF KNEE 3: CPT | Mod: 26,50

## 2024-04-15 PROCEDURE — 73610 X-RAY EXAM OF ANKLE: CPT

## 2024-04-15 PROCEDURE — 73630 X-RAY EXAM OF FOOT: CPT | Mod: 26,50

## 2024-04-15 PROCEDURE — 73562 X-RAY EXAM OF KNEE 3: CPT

## 2024-04-15 PROCEDURE — 73630 X-RAY EXAM OF FOOT: CPT

## 2024-05-06 ENCOUNTER — RX RENEWAL (OUTPATIENT)
Age: 74
End: 2024-05-06

## 2024-05-14 ENCOUNTER — NON-APPOINTMENT (OUTPATIENT)
Age: 74
End: 2024-05-14

## 2024-05-14 ENCOUNTER — APPOINTMENT (OUTPATIENT)
Dept: PULMONOLOGY | Facility: CLINIC | Age: 74
End: 2024-05-14
Payer: MEDICARE

## 2024-05-14 VITALS
WEIGHT: 173 LBS | HEART RATE: 73 BPM | HEIGHT: 61 IN | BODY MASS INDEX: 32.66 KG/M2 | OXYGEN SATURATION: 95 % | DIASTOLIC BLOOD PRESSURE: 74 MMHG | SYSTOLIC BLOOD PRESSURE: 120 MMHG | TEMPERATURE: 95.6 F

## 2024-05-14 DIAGNOSIS — J84.10 PULMONARY FIBROSIS, UNSPECIFIED: ICD-10-CM

## 2024-05-14 PROCEDURE — 99214 OFFICE O/P EST MOD 30 MIN: CPT

## 2024-05-14 NOTE — HISTORY OF PRESENT ILLNESS
[TextBox_4] : Patient doing well. No pulmonary symptoms at present. Denies dysponea, cough, wheeze, sputum

## 2024-05-20 NOTE — ED PROVIDER NOTE - PATIENT'S SEXUAL ORIENTATION
Various etiologies considered as cause of pain, some specific to particular joints, others more systemic. Pending CELE, may conduct autoimmune workup for polyarthralgia if not done already. With pain to passive shoulder movement, as well as hx diabetes adhesive capsulitis considered. Trigger finger likely for R finger flexural tightness, though exam benign today. Patient did note she has history arthritis so that likely contributing. Also history trauma to joints likely contributing based on patient's story.   Heterosexual

## 2024-05-29 ENCOUNTER — APPOINTMENT (OUTPATIENT)
Dept: CARDIOLOGY | Facility: CLINIC | Age: 74
End: 2024-05-29
Payer: MEDICARE

## 2024-05-29 ENCOUNTER — NON-APPOINTMENT (OUTPATIENT)
Age: 74
End: 2024-05-29

## 2024-05-29 VITALS
WEIGHT: 173 LBS | OXYGEN SATURATION: 96 % | SYSTOLIC BLOOD PRESSURE: 132 MMHG | HEART RATE: 71 BPM | DIASTOLIC BLOOD PRESSURE: 80 MMHG | BODY MASS INDEX: 32.69 KG/M2

## 2024-05-29 DIAGNOSIS — I10 ESSENTIAL (PRIMARY) HYPERTENSION: ICD-10-CM

## 2024-05-29 DIAGNOSIS — M79.606 PAIN IN LEG, UNSPECIFIED: ICD-10-CM

## 2024-05-29 DIAGNOSIS — E66.9 OBESITY, UNSPECIFIED: ICD-10-CM

## 2024-05-29 DIAGNOSIS — M79.89 OTHER SPECIFIED SOFT TISSUE DISORDERS: ICD-10-CM

## 2024-05-29 DIAGNOSIS — R60.0 LOCALIZED EDEMA: ICD-10-CM

## 2024-05-29 DIAGNOSIS — E78.5 HYPERLIPIDEMIA, UNSPECIFIED: ICD-10-CM

## 2024-05-29 PROCEDURE — 93000 ELECTROCARDIOGRAM COMPLETE: CPT

## 2024-05-29 PROCEDURE — 99205 OFFICE O/P NEW HI 60 MIN: CPT

## 2024-05-29 RX ORDER — GABAPENTIN 100 MG/1
100 CAPSULE ORAL
Qty: 90 | Refills: 0 | Status: DISCONTINUED | COMMUNITY
Start: 2023-06-29 | End: 2024-05-29

## 2024-05-29 RX ORDER — ASPIRIN 81 MG
81 TABLET, DELAYED RELEASE (ENTERIC COATED) ORAL DAILY
Refills: 0 | Status: ACTIVE | COMMUNITY

## 2024-05-29 RX ORDER — ROSUVASTATIN CALCIUM 40 MG/1
40 TABLET, FILM COATED ORAL
Qty: 90 | Refills: 0 | Status: DISCONTINUED | COMMUNITY
Start: 2019-12-03 | End: 2024-05-29

## 2024-05-31 PROBLEM — E78.5 HYPERLIPIDEMIA: Status: ACTIVE | Noted: 2022-03-14

## 2024-05-31 PROBLEM — I10 HYPERTENSION: Status: ACTIVE | Noted: 2020-01-28

## 2024-05-31 PROBLEM — E66.9 OBESITY (BMI 30.0-34.9): Status: ACTIVE | Noted: 2022-02-07

## 2024-05-31 NOTE — REVIEW OF SYSTEMS
[Negative] : Heme/Lymph [Lower Ext Edema] : lower extremity edema [Joint Pain] : joint pain [Joint Swelling] : joint swelling [Joint Stiffness] : joint stiffness

## 2024-05-31 NOTE — PHYSICAL EXAM
[General Appearance - Well Developed] : well developed [Normal Appearance] : normal appearance [Well Groomed] : well groomed [General Appearance - Well Nourished] : well nourished [No Deformities] : no deformities [General Appearance - In No Acute Distress] : no acute distress [Heart Rate And Rhythm] : heart rate and rhythm were normal [Heart Sounds] : normal S1 and S2 [Murmurs] : no murmurs present [] : no respiratory distress [Respiration, Rhythm And Depth] : normal respiratory rhythm and effort [Exaggerated Use Of Accessory Muscles For Inspiration] : no accessory muscle use [Auscultation Breath Sounds / Voice Sounds] : lungs were clear to auscultation bilaterally [No Skin Ulcers] : no skin ulcer [Oriented To Time, Place, And Person] : oriented to person, place, and time [Impaired Insight] : insight and judgment were intact [Affect] : the affect was normal [FreeTextEntry1] : +varicose and spider veins

## 2024-05-31 NOTE — REASON FOR VISIT
[Consultation] : a consultation regarding [Peripheral Vascular Disease] : peripheral vascular disease [FreeTextEntry1] : 73 yo F w/ bilateral peripheral reticular pattern, GGO and traction bronchiectasis likely pulmonary fibrosis since 2019, most notable in RML and LLL. joint pain, referred for vascular medicine evaluation for leg swelling.   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 This is complicated by arthritis with frequent knee and hip pain. She endorses a FHx of varicose veins from her grandmother and mother. She has no hx of VTE. She has a long-standing heart murmur but does not think she was told it was pathologic. She has no hx of kidney issues. She has gained weight in the recent years and was diagnosed with FLD. She has not had any imaging at [facility] but saw a vascular doctor 4-5 years ago and was told she has no clots and was told to wear ace wraps. Her last TTE was with her cardiologist last November.

## 2024-05-31 NOTE — ASSESSMENT
[FreeTextEntry1] : Assessment: 1. Leg swelling: ddx includes Venous Insufficiency, Lipoedema and Lymphedema given OA   Plan: - F/U venous reflux testing to r/o venous insufficiency  - Encourage weight loss and compression to limit edema, gave her the information for CircAid so easier to get on.  Also discussed that we could set her up for pneumatic pumps once the work-up was completed.    - Can consider GLP1 agonist with PMD/cardiologist to assist with weight loss.    Follow-up once vascular imaging is completed

## 2024-05-31 NOTE — REASON FOR VISIT
[Consultation] : a consultation regarding [Peripheral Vascular Disease] : peripheral vascular disease [FreeTextEntry1] : 73 yo F w/ bilateral peripheral reticular pattern, GGO and traction bronchiectasis likely pulmonary fibrosis since 2019, most notable in RML and LLL. joint pain, referred for vascular medicine evaluation for leg swelling.   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 This is complicated by arthritis with frequent knee and hip pain. She endorses a FHx of varicose veins from her grandmother and mother. She has no hx of VTE. She has a long-standing heart murmur but does not think she was told it was pathologic. She has no hx of kidney issues. She has gained weight in the recent years and was diagnosed with FLD. She has not had any imaging at [facility] but saw a vascular doctor 4-5 years ago and was told she has no clots and was told to wear ace wraps. Her last TTE was with her cardiologist last November.

## 2024-05-31 NOTE — PHYSICAL EXAM
[General Appearance - Well Developed] : well developed [Normal Appearance] : normal appearance [Well Groomed] : well groomed [General Appearance - Well Nourished] : well nourished [No Deformities] : no deformities [General Appearance - In No Acute Distress] : no acute distress [Heart Rate And Rhythm] : heart rate and rhythm were normal [Heart Sounds] : normal S1 and S2 [Murmurs] : no murmurs present [] : no respiratory distress [Respiration, Rhythm And Depth] : normal respiratory rhythm and effort [Auscultation Breath Sounds / Voice Sounds] : lungs were clear to auscultation bilaterally [Exaggerated Use Of Accessory Muscles For Inspiration] : no accessory muscle use [No Skin Ulcers] : no skin ulcer [Oriented To Time, Place, And Person] : oriented to person, place, and time [Impaired Insight] : insight and judgment were intact [Affect] : the affect was normal [FreeTextEntry1] : +varicose and spider veins

## 2024-06-05 PROBLEM — M79.89 LEG SWELLING: Status: ACTIVE | Noted: 2024-06-05

## 2024-06-05 PROBLEM — M79.606 LEG PAIN: Status: ACTIVE | Noted: 2024-06-05

## 2024-06-09 ENCOUNTER — RX RENEWAL (OUTPATIENT)
Age: 74
End: 2024-06-09

## 2024-06-09 RX ORDER — DICLOFENAC SODIUM 1% 10 MG/G
1 GEL TOPICAL
Qty: 100 | Refills: 0 | Status: ACTIVE | COMMUNITY
Start: 2023-12-27 | End: 1900-01-01

## 2024-06-17 ENCOUNTER — OUTPATIENT (OUTPATIENT)
Dept: OUTPATIENT SERVICES | Facility: HOSPITAL | Age: 74
LOS: 1 days | End: 2024-06-17

## 2024-06-17 ENCOUNTER — APPOINTMENT (OUTPATIENT)
Dept: CV DIAGNOSITCS | Facility: HOSPITAL | Age: 74
End: 2024-06-17

## 2024-06-17 ENCOUNTER — RESULT REVIEW (OUTPATIENT)
Age: 74
End: 2024-06-17

## 2024-06-17 DIAGNOSIS — M79.89 OTHER SPECIFIED SOFT TISSUE DISORDERS: ICD-10-CM

## 2024-06-17 DIAGNOSIS — Z98.890 OTHER SPECIFIED POSTPROCEDURAL STATES: Chronic | ICD-10-CM

## 2024-06-17 PROCEDURE — 93923 UPR/LXTR ART STDY 3+ LVLS: CPT | Mod: 26

## 2024-06-17 PROCEDURE — 93922 UPR/L XTREMITY ART 2 LEVELS: CPT | Mod: 26,59

## 2024-06-17 PROCEDURE — 93970 EXTREMITY STUDY: CPT | Mod: 26

## 2024-07-16 ENCOUNTER — RX RENEWAL (OUTPATIENT)
Age: 74
End: 2024-07-16

## 2024-07-17 ENCOUNTER — APPOINTMENT (OUTPATIENT)
Dept: CARDIOLOGY | Facility: CLINIC | Age: 74
End: 2024-07-17
Payer: MEDICARE

## 2024-07-17 VITALS
HEART RATE: 74 BPM | DIASTOLIC BLOOD PRESSURE: 75 MMHG | BODY MASS INDEX: 33.07 KG/M2 | SYSTOLIC BLOOD PRESSURE: 119 MMHG | WEIGHT: 175 LBS | OXYGEN SATURATION: 96 %

## 2024-07-17 DIAGNOSIS — M79.89 OTHER SPECIFIED SOFT TISSUE DISORDERS: ICD-10-CM

## 2024-07-17 DIAGNOSIS — R60.0 LOCALIZED EDEMA: ICD-10-CM

## 2024-07-17 DIAGNOSIS — M25.50 PAIN IN UNSPECIFIED JOINT: ICD-10-CM

## 2024-07-17 DIAGNOSIS — I89.0 LYMPHEDEMA, NOT ELSEWHERE CLASSIFIED: ICD-10-CM

## 2024-07-17 DIAGNOSIS — R60.9 EDEMA, UNSPECIFIED: ICD-10-CM

## 2024-07-17 PROCEDURE — 99215 OFFICE O/P EST HI 40 MIN: CPT

## 2024-07-19 PROBLEM — R60.9 LIPOEDEMA: Status: ACTIVE | Noted: 2024-07-19

## 2024-07-19 PROBLEM — I89.0 LYMPHEDEMA: Status: ACTIVE | Noted: 2024-07-19

## 2024-07-22 ENCOUNTER — APPOINTMENT (OUTPATIENT)
Dept: GASTROENTEROLOGY | Facility: CLINIC | Age: 74
End: 2024-07-22

## 2024-08-26 ENCOUNTER — APPOINTMENT (OUTPATIENT)
Dept: PULMONOLOGY | Facility: CLINIC | Age: 74
End: 2024-08-26

## 2024-09-27 ENCOUNTER — RX RENEWAL (OUTPATIENT)
Age: 74
End: 2024-09-27

## 2024-10-05 ENCOUNTER — EMERGENCY (EMERGENCY)
Facility: HOSPITAL | Age: 74
LOS: 1 days | Discharge: HOME | End: 2024-10-05
Attending: STUDENT IN AN ORGANIZED HEALTH CARE EDUCATION/TRAINING PROGRAM | Admitting: STUDENT IN AN ORGANIZED HEALTH CARE EDUCATION/TRAINING PROGRAM
Payer: MEDICARE

## 2024-10-05 VITALS
HEART RATE: 59 BPM | HEIGHT: 61 IN | OXYGEN SATURATION: 96 % | WEIGHT: 169.98 LBS | SYSTOLIC BLOOD PRESSURE: 150 MMHG | TEMPERATURE: 99 F | RESPIRATION RATE: 16 BRPM | DIASTOLIC BLOOD PRESSURE: 85 MMHG

## 2024-10-05 VITALS
TEMPERATURE: 99 F | DIASTOLIC BLOOD PRESSURE: 76 MMHG | HEART RATE: 54 BPM | OXYGEN SATURATION: 100 % | RESPIRATION RATE: 16 BRPM | SYSTOLIC BLOOD PRESSURE: 153 MMHG

## 2024-10-05 DIAGNOSIS — Z90.49 ACQUIRED ABSENCE OF OTHER SPECIFIED PARTS OF DIGESTIVE TRACT: Chronic | ICD-10-CM

## 2024-10-05 DIAGNOSIS — Z98.890 OTHER SPECIFIED POSTPROCEDURAL STATES: Chronic | ICD-10-CM

## 2024-10-05 LAB
ALBUMIN SERPL ELPH-MCNC: 4 G/DL — SIGNIFICANT CHANGE UP (ref 3.3–5)
ALP SERPL-CCNC: 92 U/L — SIGNIFICANT CHANGE UP (ref 40–120)
ALT FLD-CCNC: 13 U/L — SIGNIFICANT CHANGE UP (ref 4–33)
ANION GAP SERPL CALC-SCNC: 11 MMOL/L — SIGNIFICANT CHANGE UP (ref 7–14)
AST SERPL-CCNC: 23 U/L — SIGNIFICANT CHANGE UP (ref 4–32)
BASOPHILS # BLD AUTO: 0.03 K/UL — SIGNIFICANT CHANGE UP (ref 0–0.2)
BASOPHILS NFR BLD AUTO: 0.5 % — SIGNIFICANT CHANGE UP (ref 0–2)
BILIRUB SERPL-MCNC: 0.3 MG/DL — SIGNIFICANT CHANGE UP (ref 0.2–1.2)
BUN SERPL-MCNC: 12 MG/DL — SIGNIFICANT CHANGE UP (ref 7–23)
CALCIUM SERPL-MCNC: 9 MG/DL — SIGNIFICANT CHANGE UP (ref 8.4–10.5)
CHLORIDE SERPL-SCNC: 104 MMOL/L — SIGNIFICANT CHANGE UP (ref 98–107)
CO2 SERPL-SCNC: 24 MMOL/L — SIGNIFICANT CHANGE UP (ref 22–31)
CREAT SERPL-MCNC: 0.46 MG/DL — LOW (ref 0.5–1.3)
EGFR: 100 ML/MIN/1.73M2 — SIGNIFICANT CHANGE UP
EOSINOPHIL # BLD AUTO: 0.07 K/UL — SIGNIFICANT CHANGE UP (ref 0–0.5)
EOSINOPHIL NFR BLD AUTO: 1.1 % — SIGNIFICANT CHANGE UP (ref 0–6)
GLUCOSE SERPL-MCNC: 97 MG/DL — SIGNIFICANT CHANGE UP (ref 70–99)
HCT VFR BLD CALC: 40.8 % — SIGNIFICANT CHANGE UP (ref 34.5–45)
HGB BLD-MCNC: 13.3 G/DL — SIGNIFICANT CHANGE UP (ref 11.5–15.5)
IANC: 3.98 K/UL — SIGNIFICANT CHANGE UP (ref 1.8–7.4)
IMM GRANULOCYTES NFR BLD AUTO: 0.2 % — SIGNIFICANT CHANGE UP (ref 0–0.9)
LYMPHOCYTES # BLD AUTO: 1.77 K/UL — SIGNIFICANT CHANGE UP (ref 1–3.3)
LYMPHOCYTES # BLD AUTO: 27.8 % — SIGNIFICANT CHANGE UP (ref 13–44)
MAGNESIUM SERPL-MCNC: 2.1 MG/DL — SIGNIFICANT CHANGE UP (ref 1.6–2.6)
MCHC RBC-ENTMCNC: 29.9 PG — SIGNIFICANT CHANGE UP (ref 27–34)
MCHC RBC-ENTMCNC: 32.6 GM/DL — SIGNIFICANT CHANGE UP (ref 32–36)
MCV RBC AUTO: 91.7 FL — SIGNIFICANT CHANGE UP (ref 80–100)
MONOCYTES # BLD AUTO: 0.51 K/UL — SIGNIFICANT CHANGE UP (ref 0–0.9)
MONOCYTES NFR BLD AUTO: 8 % — SIGNIFICANT CHANGE UP (ref 2–14)
NEUTROPHILS # BLD AUTO: 3.98 K/UL — SIGNIFICANT CHANGE UP (ref 1.8–7.4)
NEUTROPHILS NFR BLD AUTO: 62.4 % — SIGNIFICANT CHANGE UP (ref 43–77)
NRBC # BLD: 0 /100 WBCS — SIGNIFICANT CHANGE UP (ref 0–0)
NRBC # FLD: 0 K/UL — SIGNIFICANT CHANGE UP (ref 0–0)
PLATELET # BLD AUTO: 274 K/UL — SIGNIFICANT CHANGE UP (ref 150–400)
POTASSIUM SERPL-MCNC: 4.9 MMOL/L — SIGNIFICANT CHANGE UP (ref 3.5–5.3)
POTASSIUM SERPL-SCNC: 4.9 MMOL/L — SIGNIFICANT CHANGE UP (ref 3.5–5.3)
PROT SERPL-MCNC: 7.7 G/DL — SIGNIFICANT CHANGE UP (ref 6–8.3)
RBC # BLD: 4.45 M/UL — SIGNIFICANT CHANGE UP (ref 3.8–5.2)
RBC # FLD: 13.7 % — SIGNIFICANT CHANGE UP (ref 10.3–14.5)
SODIUM SERPL-SCNC: 139 MMOL/L — SIGNIFICANT CHANGE UP (ref 135–145)
WBC # BLD: 6.37 K/UL — SIGNIFICANT CHANGE UP (ref 3.8–10.5)
WBC # FLD AUTO: 6.37 K/UL — SIGNIFICANT CHANGE UP (ref 3.8–10.5)

## 2024-10-05 PROCEDURE — 73562 X-RAY EXAM OF KNEE 3: CPT | Mod: 26,RT

## 2024-10-05 PROCEDURE — 73552 X-RAY EXAM OF FEMUR 2/>: CPT | Mod: 26,RT

## 2024-10-05 PROCEDURE — 93970 EXTREMITY STUDY: CPT | Mod: 26

## 2024-10-05 PROCEDURE — 99284 EMERGENCY DEPT VISIT MOD MDM: CPT

## 2024-10-05 PROCEDURE — 73590 X-RAY EXAM OF LOWER LEG: CPT | Mod: 26,RT

## 2024-10-05 PROCEDURE — 73502 X-RAY EXAM HIP UNI 2-3 VIEWS: CPT | Mod: 26,RT

## 2024-10-05 PROCEDURE — 73610 X-RAY EXAM OF ANKLE: CPT | Mod: 26,RT

## 2024-10-05 RX ORDER — ACETAMINOPHEN 325 MG
1000 TABLET ORAL ONCE
Refills: 0 | Status: COMPLETED | OUTPATIENT
Start: 2024-10-05 | End: 2024-10-05

## 2024-10-05 RX ADMIN — Medication 400 MILLIGRAM(S): at 10:40

## 2024-10-05 RX ADMIN — Medication 1000 MILLIGRAM(S): at 11:10

## 2024-10-05 RX ADMIN — Medication 1000 MILLIGRAM(S): at 10:55

## 2024-10-05 NOTE — ED ADULT TRIAGE NOTE - CHIEF COMPLAINT QUOTE
pt c/o worsening right leg pain with swelling at ankle x 1 month since  flying on 8 hour flight. past med hx hld

## 2024-10-05 NOTE — ED PROVIDER NOTE - PATIENT PORTAL LINK FT
You can access the FollowMyHealth Patient Portal offered by [facility] by registering at the following website: [email or web address]. By joining [facility]’s FollowMyHealth portal, you will also be able to view your health information using other applications (apps) compatible with our system.

## 2024-10-05 NOTE — ED PROVIDER NOTE - PROGRESS NOTE DETAILS
[facility] PGY3 - XR without acute fracture.  Ultrasound without evidence of acute DVT.  Lab work is unremarkable.  Dispo to home with PMD follow-up.

## 2024-10-05 NOTE — ED ADULT NURSE NOTE - HOW OFTEN DO YOU HAVE A DRINK CONTAINING ALCOHOL?
Encounter addended by: Myranda Rodrigez RN on: 1/17/2024 1:04 PM   Actions taken: Order list changed, Diagnosis association updated Never

## 2024-10-05 NOTE — ED ADULT NURSE NOTE - OBJECTIVE STATEMENT
Received patient in [facility] c/o right ankle pain w/swelling, patient denies SOB, chest pain, fever. Patient is A&OX4, airway patent, breathing unlabored and even, radial pulses palpable, abdomen soft, nontender. Side rails up and safety maintained. Fall precaution in place. Call bells within reach. Received patient in [facility] c/o right knee and lower leg pain w/swelling for 20 days, patient denies SOB, chest pain, fever. Patient is A&OX4, airway patent, breathing unlabored and even, radial pulses palpable, abdomen soft, nontender. Labs obtained, 20G IV placed on right arm, awaiting US/X-ray. Side rails up and safety maintained. Fall precaution in place. Call bells within reach. Family at the bedside. Received patient in [facility] c/o right lower extremity pain, right hip pain, right knee pain for 20 days, patient denies SOB, chest pain, fever. Patient is A&OX4, airway patent, breathing unlabored and even, radial pulses palpable, abdomen soft, nontender. Labs obtained, 20G IV placed on right arm, awaiting US/X-ray. Side rails up and safety maintained. Fall precaution in place. Call bells within reach. Family at the bedside. Received patient in [facility] c/o right lower extremity pain, right hip pain, right knee pain for 20 days, patient denies SOB, chest pain, fever. PMHX HTN, Pulmonary Fibrosis Patient is A&OX4, airway patent, breathing unlabored and even, radial pulses palpable, abdomen soft, nontender. Labs obtained, 20G IV placed on right arm, awaiting US/X-ray. Side rails up and safety maintained. Fall precaution in place. Call bells within reach. Family at the bedside.

## 2024-10-05 NOTE — ED PROVIDER NOTE - WET READ LAUNCH FT
-- DO NOT REPLY / DO NOT REPLY ALL --  -- Message is from the [facility]--    COVID-19 Universal Screening: Negative    General Patient Message      Reason for Call: patient wants an appointment with a dermatologist at [facility]. Declined an appointment previously.     Caller Information       Type Contact Phone    02/26/2021 03:28 AM CST Phone (Incoming) Jeovany Bynumomayra HAGEN (Self) [phone] (M)          Alternative phone number: [phone]        Did the caller agree that this message can wait until the office reopens in the morning? YES - The Message Can Wait      Send a message to the provider’s clinical support pool.     Turnaround time given to caller:   \"This message will be sent to [state Provider's name]. The clinical team will fulfill your request as soon as they review your message when the office opens tomorrow.\"        
There are no Wet Read(s) to document.

## 2024-10-05 NOTE — ED PROVIDER NOTE - NSFOLLOWUPINSTRUCTIONS_ED_ALL_ED_FT
Today in the emergency department your evaluated for pain in your right leg.  We did x-rays that did not show any fracture and an ultrasound that did not show any blood clots.    Please follow up with your primary care physician within 1-2 weeks of discharge from the emergency department.  Please bring a copy of your results with you.  Please return to the emergency department for worsening of your symptoms.    You may take Acetaminophen over the counter as needed for pain and/or fever. Use as directed and see medication warnings.  You may take Ibuprofen over the counter as needed for pain and/or fever. Use as directed and see medication warnings.

## 2024-10-05 NOTE — ED PROVIDER NOTE - PHYSICAL EXAMINATION
GENERAL: well appearing  HEAD: normocephalic, atraumatic  HEENT: normal conjunctiva, oral mucosa moist  CARDIAC: regular rate and rhythm  PULM: speaking in full sentences, no increased work of breathing  GI: abdomen nondistended, soft, nontender  : no suprapubic tenderness  NEURO: moving all 4 extremities, answering questions appropriately  MSK: mild b/l peripheral edema, Tenderness with active range of motion of right knee and hip  SKIN: extremities warm

## 2024-10-05 NOTE — ED PROVIDER NOTE - ATTENDING CONTRIBUTION TO CARE
74 yo F hx HLD, pulmonary fibrosis, ovarian cystectomy, cholangitis s/p lap raza, pw c/o L leg pain since her flight home 20 days ago. She states that the seats were small and her r leg was pressed against the seat divider. Pain has increased daily. Denies cp/sob/palpitations. No falls/trauma.     VITALS: reviewed  GEN: NAD, A & O x 4  HEAD/EYES: NCAT, EOMI, anicteric sclerae,   ENT: mucus membranes moist, oropharynx WNL, trachea midline,  RESP: lungs CTA with equal breath sounds bilaterally, chest wall nontender and atraumatic  CV: heart with reg rhythm S1, S2, distal pulses intact and symmetric bilaterally  ABDOMEN: normoactive bowel sounds, soft, nondistended, nontender, no palpable masses  : no CVAT  MSK: extremities atraumatic and nontender, mild b/l edema b/l with + ttp on R  SKIN: warm, dry, no rash, no bruising, no cyanosis. color appropriate for ethnicity  NEURO: alert, mentating appropriately, no facial asymmetry.   PSYCH: Affect appropriate      plan for xr r/o fx/severe oa, dvt study and reassess.

## 2024-10-05 NOTE — ED PROVIDER NOTE - OBJECTIVE STATEMENT
73-year-old female with history of HLD, pulmonary fibrosis, s/p ovarian cystectomy and cholecystectomy presenting with right lower extremity pain.  Per patient she [place] and since the long flight has been having pain in her right lower extremity most significantly in her right hip joint and behind her right knee.  She has been able to ambulate but due to the significant discomfort presents to ED for further evaluation.  She otherwise has no significant headache, chest pain, shortness of breath, N/V/D/abdominal pain, dysuria, fever/chills. 74-year-old female with history of HLD, pulmonary fibrosis, s/p ovarian cystectomy and cholecystectomy presenting with right lower extremity pain.  Per patient she traveled to her [place] and since the long flight has been having pain in her right lower extremity most significantly in her right hip joint and behind her right knee.  She has been able to ambulate but due to the significant discomfort presents to ED for further evaluation.  She otherwise has no significant headache, chest pain, shortness of breath, N/V/D/abdominal pain, dysuria, fever/chills.

## 2024-10-05 NOTE — ED ADULT NURSE NOTE - NSFALLHARMRISKINTERV_ED_ALL_ED

## 2024-10-16 ENCOUNTER — APPOINTMENT (OUTPATIENT)
Dept: CT IMAGING | Facility: IMAGING CENTER | Age: 74
End: 2024-10-16

## 2024-10-16 ENCOUNTER — OUTPATIENT (OUTPATIENT)
Dept: OUTPATIENT SERVICES | Facility: HOSPITAL | Age: 74
LOS: 1 days | End: 2024-10-16
Payer: MEDICARE

## 2024-10-16 DIAGNOSIS — Z98.890 OTHER SPECIFIED POSTPROCEDURAL STATES: Chronic | ICD-10-CM

## 2024-10-16 DIAGNOSIS — J84.10 PULMONARY FIBROSIS, UNSPECIFIED: ICD-10-CM

## 2024-10-16 DIAGNOSIS — Z00.8 ENCOUNTER FOR OTHER GENERAL EXAMINATION: ICD-10-CM

## 2024-10-16 PROCEDURE — 71250 CT THORAX DX C-: CPT | Mod: 26

## 2024-10-16 PROCEDURE — 71250 CT THORAX DX C-: CPT

## 2025-01-29 ENCOUNTER — NON-APPOINTMENT (OUTPATIENT)
Age: 75
End: 2025-01-29

## 2025-01-29 ENCOUNTER — APPOINTMENT (OUTPATIENT)
Dept: CARDIOLOGY | Facility: CLINIC | Age: 75
End: 2025-01-29
Payer: MEDICARE

## 2025-01-29 VITALS
WEIGHT: 177 LBS | SYSTOLIC BLOOD PRESSURE: 136 MMHG | HEART RATE: 74 BPM | DIASTOLIC BLOOD PRESSURE: 80 MMHG | OXYGEN SATURATION: 95 % | BODY MASS INDEX: 33.44 KG/M2

## 2025-01-29 DIAGNOSIS — R60.0 LOCALIZED EDEMA: ICD-10-CM

## 2025-01-29 DIAGNOSIS — I89.0 LYMPHEDEMA, NOT ELSEWHERE CLASSIFIED: ICD-10-CM

## 2025-01-29 DIAGNOSIS — E66.811 OBESITY, CLASS 1: ICD-10-CM

## 2025-01-29 DIAGNOSIS — M79.671 PAIN IN RIGHT LEG: ICD-10-CM

## 2025-01-29 DIAGNOSIS — I10 ESSENTIAL (PRIMARY) HYPERTENSION: ICD-10-CM

## 2025-01-29 DIAGNOSIS — R60.9 EDEMA, UNSPECIFIED: ICD-10-CM

## 2025-01-29 DIAGNOSIS — E78.5 HYPERLIPIDEMIA, UNSPECIFIED: ICD-10-CM

## 2025-01-29 DIAGNOSIS — M79.605 PAIN IN RIGHT LEG: ICD-10-CM

## 2025-01-29 DIAGNOSIS — M79.604 PAIN IN RIGHT LEG: ICD-10-CM

## 2025-01-29 DIAGNOSIS — M79.672 PAIN IN RIGHT LEG: ICD-10-CM

## 2025-01-29 PROCEDURE — 99215 OFFICE O/P EST HI 40 MIN: CPT

## 2025-01-29 PROCEDURE — G2211 COMPLEX E/M VISIT ADD ON: CPT

## 2025-01-30 PROBLEM — E66.811 OBESITY (BMI 30.0-34.9): Status: ACTIVE | Noted: 2022-02-07

## 2025-02-14 ENCOUNTER — APPOINTMENT (OUTPATIENT)
Dept: NEUROLOGY | Facility: CLINIC | Age: 75
End: 2025-02-14
Payer: MEDICARE

## 2025-02-14 VITALS
BODY MASS INDEX: 33.99 KG/M2 | DIASTOLIC BLOOD PRESSURE: 80 MMHG | WEIGHT: 180 LBS | SYSTOLIC BLOOD PRESSURE: 121 MMHG | HEIGHT: 61 IN | HEART RATE: 80 BPM

## 2025-02-14 DIAGNOSIS — G31.84 MILD COGNITIVE IMPAIRMENT, SO STATED: ICD-10-CM

## 2025-02-14 PROCEDURE — 99213 OFFICE O/P EST LOW 20 MIN: CPT

## 2025-02-19 ENCOUNTER — LABORATORY RESULT (OUTPATIENT)
Age: 75
End: 2025-02-19

## 2025-02-19 ENCOUNTER — NON-APPOINTMENT (OUTPATIENT)
Age: 75
End: 2025-02-19

## 2025-02-19 LAB
ALBUMIN SERPL ELPH-MCNC: 4.1 G/DL
ALP BLD-CCNC: 106 U/L
ALT SERPL-CCNC: 16 U/L
ANION GAP SERPL CALC-SCNC: 11 MMOL/L
AST SERPL-CCNC: 20 U/L
BASOPHILS # BLD AUTO: 0.03 K/UL
BASOPHILS NFR BLD AUTO: 0.6 %
BILIRUB SERPL-MCNC: 0.2 MG/DL
BUN SERPL-MCNC: 13 MG/DL
CALCIUM SERPL-MCNC: 9.5 MG/DL
CHLORIDE SERPL-SCNC: 102 MMOL/L
CO2 SERPL-SCNC: 26 MMOL/L
CREAT SERPL-MCNC: 0.58 MG/DL
EGFR: 94 ML/MIN/1.73M2
EOSINOPHIL # BLD AUTO: 0.09 K/UL
EOSINOPHIL NFR BLD AUTO: 1.8 %
FERRITIN SERPL-MCNC: 645 NG/ML
GLUCOSE SERPL-MCNC: 113 MG/DL
HCT VFR BLD CALC: 42 %
HCYS SERPL-MCNC: 10.8 UMOL/L
HGB BLD-MCNC: 13.4 G/DL
IMM GRANULOCYTES NFR BLD AUTO: 0.2 %
IRON SATN MFR SERPL: 14 %
IRON SERPL-MCNC: 40 UG/DL
LYMPHOCYTES # BLD AUTO: 1.44 K/UL
LYMPHOCYTES NFR BLD AUTO: 28 %
MAN DIFF?: NORMAL
MCHC RBC-ENTMCNC: 30 PG
MCHC RBC-ENTMCNC: 31.9 G/DL
MCV RBC AUTO: 94.2 FL
MONOCYTES # BLD AUTO: 0.7 K/UL
MONOCYTES NFR BLD AUTO: 13.6 %
NEUTROPHILS # BLD AUTO: 2.87 K/UL
NEUTROPHILS NFR BLD AUTO: 55.8 %
PLATELET # BLD AUTO: 276 K/UL
POTASSIUM SERPL-SCNC: 3.9 MMOL/L
PROT SERPL-MCNC: 7.7 G/DL
RBC # BLD: 4.46 M/UL
RBC # FLD: 14.3 %
SODIUM SERPL-SCNC: 140 MMOL/L
TIBC SERPL-MCNC: 279 UG/DL
TSH SERPL-ACNC: 2.02 UIU/ML
UIBC SERPL-MCNC: 239 UG/DL
VIT B12 SERPL-MCNC: 427 PG/ML
WBC # FLD AUTO: 5.14 K/UL

## 2025-02-23 LAB — METHYLMALONATE SERPL-SCNC: 164 NMOL/L

## 2025-02-25 ENCOUNTER — APPOINTMENT (OUTPATIENT)
Dept: MRI IMAGING | Facility: CLINIC | Age: 75
End: 2025-02-25

## 2025-02-25 ENCOUNTER — OUTPATIENT (OUTPATIENT)
Dept: OUTPATIENT SERVICES | Facility: HOSPITAL | Age: 75
LOS: 1 days | End: 2025-02-25
Payer: MEDICARE

## 2025-02-25 DIAGNOSIS — Z90.49 ACQUIRED ABSENCE OF OTHER SPECIFIED PARTS OF DIGESTIVE TRACT: Chronic | ICD-10-CM

## 2025-02-25 DIAGNOSIS — Z00.8 ENCOUNTER FOR OTHER GENERAL EXAMINATION: ICD-10-CM

## 2025-02-25 DIAGNOSIS — Z98.890 OTHER SPECIFIED POSTPROCEDURAL STATES: Chronic | ICD-10-CM

## 2025-02-25 DIAGNOSIS — G31.84 MILD COGNITIVE IMPAIRMENT OF UNCERTAIN OR UNKNOWN ETIOLOGY: ICD-10-CM

## 2025-02-25 PROCEDURE — 76377 3D RENDER W/INTRP POSTPROCES: CPT

## 2025-02-25 PROCEDURE — 70553 MRI BRAIN STEM W/O & W/DYE: CPT | Mod: 26

## 2025-02-25 PROCEDURE — A9585: CPT

## 2025-02-25 PROCEDURE — 76377 3D RENDER W/INTRP POSTPROCES: CPT | Mod: 26

## 2025-02-25 PROCEDURE — 70553 MRI BRAIN STEM W/O & W/DYE: CPT

## 2025-03-12 NOTE — DISCHARGE NOTE PROVIDER - DISCHARGE DATE
What Type Of Note Output Would You Prefer (Optional)?: Standard Output How Severe Is Your Rash?: mild Is This A New Presentation, Or A Follow-Up?: Rash Additional History: Comes and goes for the last few years. Patient believes it is eczema. Has used neosalus in the past. 19-Aug-2023

## 2025-03-31 NOTE — CONSULT NOTE ADULT - ATTENDING COMMENTS
Patient seen and examined with the GI fellow. I agree with the above assessment and plan. Thank you for allowing us to care for your patient.    ERCP today with stone removal. Stent placed for purulent biliary discharge c/w cholangitis. Cont antibiotics. Consider CCy later in admission.
Patient is a 74 yo F w/ pulmonary fibrosis, HLD, ovarian cystectomy who was admitted  with 3 days of epigastric pain. Patient found to have choledocholithiasis after CT showing cholelithiasis w/ 1cm stone in CBD with upstream ductal dilitation and no evidence of cholesystitis. Pulmonary consulted for preoperative evaluation after CT chest showed bilateral peripheral reticular pattern, GGO and traction bronchiectasis, most notable in RML and LLL.     As per patient and  at bedside, patient was incidentally found to have pulmonary fibrosis in 2019 after routine CXR in [place]. She was referred to Dr. Cunha (Pulm) and had PFTs done and was told her disease was mild with plan to monitor off medication. However, she was lost to follow up since  due to the pandemic. She endorses noticing no change in her breathing although her ET has been limited by back and joint pains. Endorses can walk 2 blocks before stopping due to pain. Endorses intermittent dry cough. Endorses ankle, knee and back pains. Denies any rash. Mother also had lung disease but unknown which.     Patient currently breathing comfortably and saturating well on room air. Labs notable for leukocytosis to 14.93, Bili 4.6 w/ direct 3.6, ALk phos 551, , , VBG 7.33/56. CT showing cholelithiasis w/ 1cm stone in CBD with upstream ductal dilitation and no evidence of cholesystitis. Additionally it showed ?diverticulitis. CT chest showed reticular pattern w/ traction bronchiectasis and no honey combing concerning for pulmonary fibrosis.     Patient is now s/p ERCP with stent placement, stone removal.    #Pulmonary Fibrosis  #Pre operative evaluation  #Choledocholithiasis   - Currently patient is on RA with no respiratory complaints. Patient is optimized from pulmonary standpoint for possible cholecystectomy  - If not already sent, please send RF, anti-ccp, anti-centromere, CK, aldolase, myositis panel, MyoMarker Panel 3 (includes anti-MDA5 antibodies; needs a paper requisition), ANCA, SCL-70 ab, [place], DsDNA, anti-RO, anti-LA, IgG4, RNP (order as "extractable nuclear antigens"), Aislinn-1 antibodies, and HIV antibodies.  - Patient would benefit from outpatient pulmonary follows up with PFTs and sleep study  - Needs outpatient pulmonary follow up upon discharge at [address] [place] [address] [address] ([phone]).  Please e-mail [email or web address] to make an appointment for the patient.  Please include the name, , and a phone number for you and the patient)    Levon Casper MD  Pulmonary & Critical Care
31-Mar-2025 15:56

## 2025-07-30 ENCOUNTER — APPOINTMENT (OUTPATIENT)
Dept: CARDIOLOGY | Facility: CLINIC | Age: 75
End: 2025-07-30
Payer: MEDICARE

## 2025-07-30 VITALS
SYSTOLIC BLOOD PRESSURE: 125 MMHG | WEIGHT: 176 LBS | DIASTOLIC BLOOD PRESSURE: 80 MMHG | HEIGHT: 61 IN | BODY MASS INDEX: 33.23 KG/M2 | HEART RATE: 73 BPM | OXYGEN SATURATION: 96 %

## 2025-07-30 DIAGNOSIS — I10 ESSENTIAL (PRIMARY) HYPERTENSION: ICD-10-CM

## 2025-07-30 DIAGNOSIS — M79.671 PAIN IN RIGHT LEG: ICD-10-CM

## 2025-07-30 DIAGNOSIS — M79.672 PAIN IN RIGHT LEG: ICD-10-CM

## 2025-07-30 DIAGNOSIS — R60.0 LOCALIZED EDEMA: ICD-10-CM

## 2025-07-30 DIAGNOSIS — M79.604 PAIN IN RIGHT LEG: ICD-10-CM

## 2025-07-30 DIAGNOSIS — I89.0 LYMPHEDEMA, NOT ELSEWHERE CLASSIFIED: ICD-10-CM

## 2025-07-30 DIAGNOSIS — M79.89 OTHER SPECIFIED SOFT TISSUE DISORDERS: ICD-10-CM

## 2025-07-30 DIAGNOSIS — M79.605 PAIN IN RIGHT LEG: ICD-10-CM

## 2025-07-30 DIAGNOSIS — R60.9 EDEMA, UNSPECIFIED: ICD-10-CM

## 2025-07-30 DIAGNOSIS — E78.5 HYPERLIPIDEMIA, UNSPECIFIED: ICD-10-CM

## 2025-07-30 PROCEDURE — 99215 OFFICE O/P EST HI 40 MIN: CPT

## 2025-07-30 PROCEDURE — 93000 ELECTROCARDIOGRAM COMPLETE: CPT

## 2025-07-30 PROCEDURE — G2211 COMPLEX E/M VISIT ADD ON: CPT

## (undated) DEVICE — TROCAR COVIDIEN VERSAPORT BLADELESS OPTICAL 5MM STANDARD

## (undated) DEVICE — SYR LUER LOK 3CC

## (undated) DEVICE — ORGANIZER MIO MEDICAL DEVICE DISP

## (undated) DEVICE — CATH IV SAFE BC 22G X 1" (BLUE)

## (undated) DEVICE — INJ SYS RAP REFIL

## (undated) DEVICE — SALIVA EJECTOR (BLUE)

## (undated) DEVICE — SOL IRR POUR NS 0.9% 500ML

## (undated) DEVICE — DRSG MASTISOL

## (undated) DEVICE — GOWN LG

## (undated) DEVICE — BASIN EMESIS 10IN GRADUATED MAUVE

## (undated) DEVICE — DRSG STERISTRIPS 0.5 X 4"

## (undated) DEVICE — ELCTR GROUNDING PAD ADULT COVIDIEN

## (undated) DEVICE — BLADE SURGICAL #15 CARBON

## (undated) DEVICE — TROCAR COVIDIEN BLUNT TIP HASSAN 10MM

## (undated) DEVICE — SOL INJ NS 0.9% 1000ML

## (undated) DEVICE — PROTECTOR HEEL / ELBOW

## (undated) DEVICE — ELCTR ECG CONDUCTIVE ADHESIVE

## (undated) DEVICE — SUT VICRYL 0 18" ENDOLOOP LIGATURE

## (undated) DEVICE — NDL HYPO SAFE 22G X 1" (BLACK)

## (undated) DEVICE — GLV 6.5 PROTEXIS W HYDROGEL

## (undated) DEVICE — PACK IV START WITH CHG

## (undated) DEVICE — DENTURE CUP PINK

## (undated) DEVICE — DRAPE 3/4 SHEET 52X76"

## (undated) DEVICE — TIP METZENBAUM SCISSOR MONOPOLAR ENDOCUT (ORANGE)

## (undated) DEVICE — OLYMPUS DISTAL COVER ENDOSCOPE

## (undated) DEVICE — ENDOCATCH 10MM SPECIMEN POUCH

## (undated) DEVICE — SYR LUER LOK 10CC

## (undated) DEVICE — BASIN SET SINGLE

## (undated) DEVICE — UNDERPAD LINEN SAVER 17 X 24"

## (undated) DEVICE — DRSG CURITY GAUZE SPONGE 4 X 4" 12-PLY NON-STERILE

## (undated) DEVICE — SOL IRR POUR H2O 500ML

## (undated) DEVICE — SENSOR O2 FINGER ADULT

## (undated) DEVICE — LINE BREATHE SAMPLNG

## (undated) DEVICE — TUBING INSUFFLATION LAP FILTER 10FT

## (undated) DEVICE — BITE BLOCK ADULT 20 X 27MM (GREEN)

## (undated) DEVICE — SUT VICRYL 0 27" UR-6

## (undated) DEVICE — TUBING SUCTION NONCONDUCTIVE 6MM X 12FT

## (undated) DEVICE — DVC AUTO CAP RX LOKG

## (undated) DEVICE — DRSG 2X2

## (undated) DEVICE — POSITIONER STRAP ARMBOARD VELCRO TS-30

## (undated) DEVICE — ELCTR BOVIE PENCIL SMOKE EVACUATION

## (undated) DEVICE — DRSG BANDAID 0.75X3"

## (undated) DEVICE — DRAPE TOWEL 1010

## (undated) DEVICE — SUT MONOCRYL 4-0 27" PS-2 UNDYED

## (undated) DEVICE — TUBING STRYKEFLOW II SUCTION / IRRIGATOR

## (undated) DEVICE — PACK GENERAL LAPAROSCOPY

## (undated) DEVICE — TUBING OLYMPUS INSUFFLATION

## (undated) DEVICE — OMNIPAQUE 300  30ML

## (undated) DEVICE — SOL INJ NS 0.9% 500ML 1-PORT

## (undated) DEVICE — SNARE DISP

## (undated) DEVICE — DISSECTOR ENDOSCOPIC KITTNER SINGLE TIP

## (undated) DEVICE — VENODYNE/SCD SLEEVE CALF MEDIUM

## (undated) DEVICE — LOK DVC RX AND BIOPSY

## (undated) DEVICE — D HELP - CLEARVIEW CLEARIFY SYSTEM

## (undated) DEVICE — DRSG OPSITE 2.5 X 2"

## (undated) DEVICE — WARMING BLANKET UPPER ADULT